# Patient Record
Sex: MALE | Race: WHITE | URBAN - METROPOLITAN AREA
[De-identification: names, ages, dates, MRNs, and addresses within clinical notes are randomized per-mention and may not be internally consistent; named-entity substitution may affect disease eponyms.]

---

## 2023-05-23 ENCOUNTER — APPOINTMENT (OUTPATIENT)
Dept: PRIMARY CARE | Facility: CLINIC | Age: 43
End: 2023-05-23
Payer: MEDICARE

## 2023-05-24 ENCOUNTER — OFFICE VISIT (OUTPATIENT)
Dept: PRIMARY CARE | Facility: CLINIC | Age: 43
End: 2023-05-24
Payer: MEDICARE

## 2023-05-24 VITALS
DIASTOLIC BLOOD PRESSURE: 70 MMHG | OXYGEN SATURATION: 94 % | SYSTOLIC BLOOD PRESSURE: 118 MMHG | HEART RATE: 88 BPM | TEMPERATURE: 96.8 F

## 2023-05-24 DIAGNOSIS — H66.90 ACUTE OTITIS MEDIA, UNSPECIFIED OTITIS MEDIA TYPE: Primary | ICD-10-CM

## 2023-05-24 PROBLEM — I10 HYPERTENSION: Status: ACTIVE | Noted: 2023-05-24

## 2023-05-24 PROBLEM — E78.5 HYPERLIPEMIA: Status: ACTIVE | Noted: 2023-05-24

## 2023-05-24 PROCEDURE — 3078F DIAST BP <80 MM HG: CPT | Performed by: FAMILY MEDICINE

## 2023-05-24 PROCEDURE — 3008F BODY MASS INDEX DOCD: CPT | Performed by: FAMILY MEDICINE

## 2023-05-24 PROCEDURE — 1036F TOBACCO NON-USER: CPT | Performed by: FAMILY MEDICINE

## 2023-05-24 PROCEDURE — 99213 OFFICE O/P EST LOW 20 MIN: CPT | Performed by: FAMILY MEDICINE

## 2023-05-24 PROCEDURE — 3074F SYST BP LT 130 MM HG: CPT | Performed by: FAMILY MEDICINE

## 2023-05-24 RX ORDER — FUROSEMIDE 20 MG/1
20 TABLET ORAL
COMMUNITY
Start: 2022-06-13 | End: 2024-06-10 | Stop reason: ALTCHOICE

## 2023-05-24 RX ORDER — ATORVASTATIN CALCIUM 20 MG/1
TABLET, FILM COATED ORAL
COMMUNITY
Start: 2022-07-19

## 2023-05-24 RX ORDER — METOPROLOL SUCCINATE 25 MG/1
TABLET, EXTENDED RELEASE ORAL
COMMUNITY
Start: 2022-06-11 | End: 2024-06-10 | Stop reason: ALTCHOICE

## 2023-05-24 RX ORDER — DOXYCYCLINE 100 MG/1
100 CAPSULE ORAL 2 TIMES DAILY
Qty: 14 CAPSULE | Refills: 0 | Status: SHIPPED | OUTPATIENT
Start: 2023-05-24 | End: 2023-05-31

## 2023-05-24 RX ORDER — ASPIRIN 81 MG/1
TABLET ORAL
COMMUNITY
Start: 2022-07-19

## 2023-05-24 RX ORDER — DESIPRAMINE HYDROCHLORIDE 25 MG/1
25 TABLET ORAL
COMMUNITY
End: 2024-02-15 | Stop reason: WASHOUT

## 2023-05-24 RX ORDER — DESIPRAMINE HYDROCHLORIDE 50 MG/1
50 TABLET ORAL NIGHTLY
COMMUNITY

## 2023-05-24 RX ORDER — RISPERIDONE 0.5 MG/1
0.5 TABLET ORAL 2 TIMES DAILY
COMMUNITY

## 2023-05-24 RX ORDER — SPIRONOLACTONE 25 MG/1
TABLET ORAL
COMMUNITY
Start: 2022-06-11 | End: 2024-02-16 | Stop reason: SDUPTHER

## 2023-05-24 ASSESSMENT — ENCOUNTER SYMPTOMS
FEVER: 0
COUGH: 1
CHILLS: 0

## 2023-05-24 NOTE — PROGRESS NOTES
Assessment/Plan   ASSESSMENT/PLAN:      Zhang was seen today for cough.  Diagnoses and all orders for this visit:  Acute otitis media, unspecified otitis media type (Primary)  -     doxycycline (Vibramycin) 100 mg capsule; Take 1 capsule (100 mg) by mouth 2 times a day for 7 days. Take with at least 8 ounces (large glass) of water, do not lie down for 30 minutes after       Patient Instructions   Otitis media: Try doxycycline, continue follow-up with ENT     Alexi Pitts,      FUTURE DIRECTION:     Subjective   SUBJECTIVE:     Patient ID: Zhang Alvarado is a 43 y.o. malefor the following:    Ear pain  Per mother, patient had been coughing 3 weeks ago seem to have improved but started to experience more coughing over the past few days.  She also noticed that patient complaining of right ear pain.  Declining COVID test at this time      Review of Systems   Constitutional:  Negative for chills and fever.   HENT:  Positive for ear pain.    Respiratory:  Positive for cough.        Allergies   Allergen Reactions    Penicillins Diarrhea and Other     Diarrhea   Can take amoxicillin    augmentin-diarreha    diarrhea    Cephalexin Other, Rash and Unknown         Current Outpatient Medications:     aspirin 81 mg EC tablet, Take by mouth., Disp: , Rfl:     atorvastatin (Lipitor) 20 mg tablet, , Disp: , Rfl:     furosemide (Lasix) 20 mg tablet, 1 tablet (20 mg)., Disp: , Rfl:     metoprolol succinate XL (Toprol-XL) 25 mg 24 hr tablet, , Disp: , Rfl:     sacubitriL-valsartan (Entresto) 24-26 mg tablet, Take 1 tablet by mouth twice a day., Disp: 60 tablet, Rfl: 11    spironolactone (Aldactone) 25 mg tablet, , Disp: , Rfl:     desipramine (Norpramin) 25 mg tablet, Take 1 tablet (25 mg) by mouth once daily in the morning. Take before meals., Disp: , Rfl:     desipramine (Norpramin) 50 mg tablet, Take 1 tablet (50 mg) by mouth once daily at bedtime., Disp: , Rfl:     doxycycline (Vibramycin) 100 mg capsule, Take 1 capsule  (100 mg) by mouth 2 times a day for 7 days. Take with at least 8 ounces (large glass) of water, do not lie down for 30 minutes after, Disp: 14 capsule, Rfl: 0    risperiDONE (RisperDAL) 0.5 mg tablet, Take 1 tablet (0.5 mg) by mouth 2 times a day., Disp: , Rfl:      Patient Active Problem List   Diagnosis    Hypertension    Hyperlipemia       Social History     Socioeconomic History    Marital status: Single     Spouse name: Not on file    Number of children: Not on file    Years of education: Not on file    Highest education level: Not on file   Occupational History    Not on file   Tobacco Use    Smoking status: Never    Smokeless tobacco: Never   Vaping Use    Vaping status: Not on file   Substance and Sexual Activity    Alcohol use: Never    Drug use: Never    Sexual activity: Not on file   Other Topics Concern    Not on file   Social History Narrative    Not on file     Social Determinants of Health     Financial Resource Strain: Not on file   Food Insecurity: Not on file   Transportation Needs: Not on file   Physical Activity: Not on file   Stress: Not on file   Social Connections: Not on file   Intimate Partner Violence: Not on file   Housing Stability: Not on file       Objective   OBJECTIVE:     Vitals:    05/24/23 1503   BP: 118/70   Pulse: 88   Temp: 36 °C (96.8 °F)   SpO2: 94%       Exam      Physical Exam  Constitutional:       Appearance: Normal appearance.   HENT:      Head: Normocephalic.      Right Ear: Drainage present. Tympanic membrane is injected.      Left Ear: Tympanic membrane normal.      Mouth/Throat:      Pharynx: No oropharyngeal exudate or posterior oropharyngeal erythema.   Pulmonary:      Effort: Pulmonary effort is normal.   Musculoskeletal:      Cervical back: Normal range of motion.   Neurological:      Mental Status: He is alert.   Psychiatric:         Mood and Affect: Mood normal.

## 2023-07-20 LAB
ANION GAP IN SER/PLAS: 12 MMOL/L (ref 10–20)
CALCIUM (MG/DL) IN SER/PLAS: 9.4 MG/DL (ref 8.6–10.3)
CARBON DIOXIDE, TOTAL (MMOL/L) IN SER/PLAS: 32 MMOL/L (ref 21–32)
CHLORIDE (MMOL/L) IN SER/PLAS: 98 MMOL/L (ref 98–107)
CREATININE (MG/DL) IN SER/PLAS: 1.13 MG/DL (ref 0.5–1.3)
GFR MALE: 83 ML/MIN/1.73M2
GLUCOSE (MG/DL) IN SER/PLAS: 81 MG/DL (ref 74–99)
MAGNESIUM (MG/DL) IN SER/PLAS: 2.18 MG/DL (ref 1.6–2.4)
NATRIURETIC PEPTIDE B (PG/ML) IN SER/PLAS: 3 PG/ML (ref 0–99)
POTASSIUM (MMOL/L) IN SER/PLAS: 4.3 MMOL/L (ref 3.5–5.3)
SODIUM (MMOL/L) IN SER/PLAS: 138 MMOL/L (ref 136–145)
UREA NITROGEN (MG/DL) IN SER/PLAS: 18 MG/DL (ref 6–23)

## 2023-10-19 ENCOUNTER — PHARMACY VISIT (OUTPATIENT)
Dept: PHARMACY | Facility: CLINIC | Age: 43
End: 2023-10-19
Payer: COMMERCIAL

## 2023-10-29 ENCOUNTER — PHARMACY VISIT (OUTPATIENT)
Dept: PHARMACY | Facility: CLINIC | Age: 43
End: 2023-10-29
Payer: COMMERCIAL

## 2023-11-10 ENCOUNTER — PHARMACY VISIT (OUTPATIENT)
Dept: PHARMACY | Facility: CLINIC | Age: 43
End: 2023-11-10
Payer: COMMERCIAL

## 2023-11-10 PROCEDURE — RXMED WILLOW AMBULATORY MEDICATION CHARGE

## 2023-11-28 ENCOUNTER — PHARMACY VISIT (OUTPATIENT)
Dept: PHARMACY | Facility: CLINIC | Age: 43
End: 2023-11-28
Payer: COMMERCIAL

## 2023-11-28 PROCEDURE — RXMED WILLOW AMBULATORY MEDICATION CHARGE

## 2023-11-28 RX ORDER — ATORVASTATIN CALCIUM 20 MG/1
20 TABLET, FILM COATED ORAL NIGHTLY
Qty: 90 TABLET | Refills: 1 | Status: CANCELLED | OUTPATIENT
Start: 2023-11-28 | End: 2024-11-27

## 2023-11-29 ENCOUNTER — PHARMACY VISIT (OUTPATIENT)
Dept: PHARMACY | Facility: CLINIC | Age: 43
End: 2023-11-29
Payer: COMMERCIAL

## 2023-11-29 DIAGNOSIS — E78.5 HYPERLIPIDEMIA, UNSPECIFIED HYPERLIPIDEMIA TYPE: Primary | ICD-10-CM

## 2023-11-29 PROCEDURE — RXMED WILLOW AMBULATORY MEDICATION CHARGE

## 2023-11-29 RX ORDER — ATORVASTATIN CALCIUM 20 MG/1
20 TABLET, FILM COATED ORAL NIGHTLY
Qty: 90 TABLET | Refills: 1 | Status: SHIPPED | OUTPATIENT
Start: 2023-11-29 | End: 2024-06-10 | Stop reason: ALTCHOICE

## 2023-11-29 RX ORDER — ATORVASTATIN CALCIUM 20 MG/1
20 TABLET, FILM COATED ORAL NIGHTLY
Qty: 90 TABLET | Refills: 1 | Status: CANCELLED | OUTPATIENT
Start: 2023-11-28 | End: 2024-11-27

## 2024-01-09 ENCOUNTER — TELEPHONE (OUTPATIENT)
Dept: CARDIOLOGY | Facility: CLINIC | Age: 44
End: 2024-01-09
Payer: COMMERCIAL

## 2024-01-09 DIAGNOSIS — I50.42 CHRONIC COMBINED SYSTOLIC AND DIASTOLIC HEART FAILURE (MULTI): ICD-10-CM

## 2024-01-09 DIAGNOSIS — I10 HYPERTENSION, UNSPECIFIED TYPE: Primary | ICD-10-CM

## 2024-01-09 NOTE — TELEPHONE ENCOUNTER
1/9/24  1525  Labs ordered for upcoming appt; per Dr. Weinberg' last office note.    Called mother of patient and informed her of such.    Mother verbalized understanding.    ----- Message from Edilma Dove sent at 1/9/2024 10:03 AM EST -----  Patient is saying he has an echocardiogram on 01/17/24, he is not scheduled for that. The system says 07/17. Also she said he's to have blood work it is not under labs.  Can you call   Zhang Flores's mom and guardian at [phone].  Thank you :)

## 2024-01-11 ENCOUNTER — LAB (OUTPATIENT)
Dept: LAB | Facility: LAB | Age: 44
End: 2024-01-11
Payer: MEDICARE

## 2024-01-11 DIAGNOSIS — I10 HYPERTENSION, UNSPECIFIED TYPE: ICD-10-CM

## 2024-01-11 DIAGNOSIS — I50.42 CHRONIC COMBINED SYSTOLIC AND DIASTOLIC HEART FAILURE (MULTI): ICD-10-CM

## 2024-01-11 LAB
ANION GAP SERPL CALC-SCNC: 12 MMOL/L (ref 10–20)
BNP SERPL-MCNC: 6 PG/ML (ref 0–99)
BUN SERPL-MCNC: 19 MG/DL (ref 6–23)
CALCIUM SERPL-MCNC: 9.5 MG/DL (ref 8.6–10.3)
CHLORIDE SERPL-SCNC: 101 MMOL/L (ref 98–107)
CO2 SERPL-SCNC: 28 MMOL/L (ref 21–32)
CREAT SERPL-MCNC: 0.9 MG/DL (ref 0.5–1.3)
EGFRCR SERPLBLD CKD-EPI 2021: >90 ML/MIN/1.73M*2
GLUCOSE SERPL-MCNC: 90 MG/DL (ref 74–99)
MAGNESIUM SERPL-MCNC: 2.28 MG/DL (ref 1.6–2.4)
POTASSIUM SERPL-SCNC: 4 MMOL/L (ref 3.5–5.3)
SODIUM SERPL-SCNC: 137 MMOL/L (ref 136–145)

## 2024-01-11 PROCEDURE — 36415 COLL VENOUS BLD VENIPUNCTURE: CPT

## 2024-01-11 PROCEDURE — 83735 ASSAY OF MAGNESIUM: CPT

## 2024-01-11 PROCEDURE — 80048 BASIC METABOLIC PNL TOTAL CA: CPT

## 2024-01-11 PROCEDURE — 83880 ASSAY OF NATRIURETIC PEPTIDE: CPT

## 2024-01-17 ENCOUNTER — HOSPITAL ENCOUNTER (OUTPATIENT)
Dept: CARDIOLOGY | Facility: HOSPITAL | Age: 44
Discharge: HOME | End: 2024-01-17
Payer: MEDICARE

## 2024-01-17 DIAGNOSIS — I50.42 CHRONIC COMBINED SYSTOLIC (CONGESTIVE) AND DIASTOLIC (CONGESTIVE) HEART FAILURE (MULTI): ICD-10-CM

## 2024-01-17 DIAGNOSIS — I25.10 ATHEROSCLEROTIC HEART DISEASE OF NATIVE CORONARY ARTERY WITHOUT ANGINA PECTORIS: ICD-10-CM

## 2024-01-17 DIAGNOSIS — I50.40 UNSPECIFIED COMBINED SYSTOLIC (CONGESTIVE) AND DIASTOLIC (CONGESTIVE) HEART FAILURE (MULTI): ICD-10-CM

## 2024-01-17 PROCEDURE — 93306 TTE W/DOPPLER COMPLETE: CPT | Performed by: INTERNAL MEDICINE

## 2024-01-17 PROCEDURE — 93306 TTE W/DOPPLER COMPLETE: CPT

## 2024-01-17 PROCEDURE — 2500000004 HC RX 250 GENERAL PHARMACY W/ HCPCS (ALT 636 FOR OP/ED): Performed by: INTERNAL MEDICINE

## 2024-01-17 RX ADMIN — HUMAN ALBUMIN MICROSPHERES AND PERFLUTREN 0.5 ML: 10; .22 INJECTION, SOLUTION INTRAVENOUS at 09:05

## 2024-01-18 ENCOUNTER — TELEPHONE (OUTPATIENT)
Dept: CARDIOLOGY | Facility: CLINIC | Age: 44
End: 2024-01-18
Payer: COMMERCIAL

## 2024-01-18 LAB
EJECTION FRACTION APICAL 4 CHAMBER: 46.8
EJECTION FRACTION: 45
LEFT ATRIUM VOLUME AREA LENGTH INDEX BSA: 26.8
LEFT VENTRICLE INTERNAL DIMENSION DIASTOLE: 6.1 (ref 3.5–6)
LEFT VENTRICULAR OUTFLOW TRACT DIAMETER: 2.3
MITRAL VALVE E/A RATIO: 1.02
MITRAL VALVE E/E' RATIO: 8.2
RIGHT VENTRICLE FREE WALL PEAK S': 12.5
TRICUSPID ANNULAR PLANE SYSTOLIC EXCURSION: 2

## 2024-01-18 NOTE — TELEPHONE ENCOUNTER
1/18/24  1529  Called results to mother of patient who is patient's guardian. Mother verbalized understanding and will go over results at office visit on 1/24/24.      ----- Message from Rajesh Kumari DO sent at 1/18/2024 10:22 AM EST -----  Mildly reduced left ventricular systolic function with an ejection fraction of 45%, grade 2 diastolic dysfunction, normal right ventricular systolic function, no significant valve abnormalities.

## 2024-01-20 PROBLEM — E66.9 OBESITY (BMI 30-39.9): Status: ACTIVE | Noted: 2024-01-20

## 2024-01-20 PROBLEM — I50.42 CHRONIC COMBINED SYSTOLIC AND DIASTOLIC HEART FAILURE (MULTI): Status: ACTIVE | Noted: 2024-01-20

## 2024-01-20 PROBLEM — I25.10 CORONARY ARTERY DISEASE INVOLVING NATIVE CORONARY ARTERY OF NATIVE HEART: Status: ACTIVE | Noted: 2024-01-20

## 2024-01-20 PROBLEM — F89 DEVELOPMENTAL DISABILITY: Status: ACTIVE | Noted: 2024-01-20

## 2024-01-20 PROBLEM — G47.33 OSA (OBSTRUCTIVE SLEEP APNEA): Status: ACTIVE | Noted: 2024-01-20

## 2024-01-20 NOTE — PROGRESS NOTES
[facility] Heart and Vascular Cardiology    Patient Name: Zhang Alvarado  Patient : 1980      Scribe Attestation  By signing my name below, I, Marly Grandaibsophia   attest that this documentation has been prepared under the direction and in the presence of Rajesh Kumari DO.      Reason for visit:  This is a 43-year-old male here for follow-up regarding chronic systolic and diastolic heart failure with an ejection fraction of 45%, minimal coronary artery disease as seen on cardiac catheterization done in 2022, dyslipidemia, developmental disability, obstructive sleep apnea, and obesity.    HPI:  This is a 43-year-old male here for follow-up regarding chronic systolic and diastolic heart failure with an ejection fraction of 45%, minimal coronary artery disease as seen on cardiac catheterization done in 2022, dyslipidemia, developmental disability, obstructive sleep apnea, and obesity.  The patient was last evaluated by me in 2023.  At that visit I had ordered blood work including BMP/BNP/magnesium, ordered an echocardiogram to be completed in 6 months, and asked the patient to follow-up in 6 months and sooner if necessary.  BMP done in 2024 showed normal serum sodium and potassium with a serum creatinine of 0.90, serum magnesium was 2.28, BNP was 6. Echocardiogram done in 2024 showed mildly reduced left ventricular systolic function with an ejection fraction of 45%, grade 2 diastolic dysfunction, normal right ventricular systolic function, no significant valve abnormalities.  ECG done today showed sinus rhythm with a heart rate of 91 bpm. The patient reports that he has been feeling generally well from the cardiac standpoint. He denies any new chest pain, shortness of breath, palpitations and lightheadedness. He states that he is trying to eat healthier food choices and staying physically active by playing sports. He states that he takes all of his medications as  prescribed. During my exam, he was resting comfortably on the exam table.        Assessment/Plan:   1. Chronic systolic and diastolic heart failure  The patient has a history of systolic and diastolic heart failure with an ejection fraction of 45%.  Echocardiogram done in January 2024 showed mildly reduced left ventricular systolic function with an ejection fraction of 45%, grade 2 diastolic dysfunction, normal right ventricular systolic function, no significant valve abnormalities.    He does not appear significantly volume overloaded on exam today.   He should continue current heart failure medications.  Recent lab works as noted in the HPI.   Lab works as noted below will be done in 6 months prior to his next visit.  I discussed with him the importance of following a low-sodium heart healthy diet as well as weight loss.   Follow up in 6 months and sooner if necessary.      2. Coronary artery disease  The patient has a history of minimal coronary artery disease as seen on cardiac catheterization done in June 2022.  ECG done today showed sinus rhythm with a heart rate of 91 bpm.    He denies anginal chest discomfort.  Blood pressure appears controlled on exam today.  He should continue current antihypertensive medications and antiplatelet therapy.  Echocardiogram done in January 2024 showed mildly reduced left ventricular systolic function with an ejection fraction of 45%, grade 2 diastolic dysfunction, normal right ventricular systolic function, no significant valve abnormalities.    Lipid panel done in February 2023 showed an LDL cholesterol 43 and triglycerides of 245 while on atorvastatin 20 mg daily.   Recent lab works as noted in the HPI.  Lab works will be done today and again in 6 months prior to the next visit.   Please see lifestyle recommendations below.   Follow up in 6 months and sooner if necessary.      3. Dyslipidemia  Lipid panel done in February 2023 showed an LDL cholesterol 43 and triglycerides  of 245 while on atorvastatin 20 mg daily.   Lipid panel will be done in 6 months.  Please see lifestyle recommendations below.      4. Developmental disability  Management as per PCP.      5. Obstructive sleep apnea  The patient has a history of obstructive sleep apnea.  He should continue to follow up with Sleep Medicine.     6. Obesity  Please see lifestyle recommendations below.         Orders:   CMP/lipid/magnesium/CBC in 6 months,   Follow-up in 6 months.      Lifestyle Recommendations  I recommend a whole-food plant-based diet, an eating pattern that encourages the consumption of unrefined plant foods (such as fruits, vegetables, tubers, whole grains, legumes, nuts and seeds) and discourages meats, dairy products, eggs and processed foods.     The AHA/ACC recommends that the patient consume a dietary pattern that emphasizes intake of vegetables, fruits, and whole grains; includes low-fat dairy products, poultry, fish, legumes, non-tropical vegetable oils, and nuts; and limits intake of sodium, sweets, sugar-sweetened beverages, and red meats.  Adapt this dietary pattern to appropriate calorie requirements (a 500-750 kcal/day deficit to loose weight), personal and cultural food preferences, and nutrition therapy for other medical conditions (including diabetes).  Achieve this pattern by following plans such as the [place] Mediterranean, DASH dietary pattern, or AHA diet.     Engage in 2 hours and 30 minutes per week of moderate-intensity physical activity, or 1 hour and 15 minutes (75 minutes) per week of vigorous-intensity aerobic physical activity, or an equivalent combination of moderate and vigorous-intensity aerobic physical activity. Aerobic activity should be performed in episodes of at least 10 minutes preferably spread throughout the week.     Adhering to a heart healthy diet, regular exercise habits, avoidance of tobacco products, and maintenance of a healthy weight are crucial components of their heart  disease risk reduction.     Any positive review of systems not specifically addressed in the office visit today should be evaluated and treated by the patients primary care physician or in an emergency department if necessary     Patient was notified that results from ordered tests will be called to the patient if it changes current management; it will otherwise be discussed at a future appointment and available on [facility].     Thank you for allowing me to participate in the care of this patient.        This document was generated using the assistance of voice recognition software. If there are any errors of spelling, grammar, syntax, or meaning; please feel free to contact me directly for clarification.    Past Medical History:  He has a past medical history of Disturbances of salivary secretion, Other conditions influencing health status, Personal history of diseases of the skin and subcutaneous tissue, Personal history of other diseases of the circulatory system, and Raynaud's syndrome without gangrene.    Past Surgical History:  He has a past surgical history that includes Other surgical history (04/05/2019).      Social History:  He reports that he has never smoked. He has never used smokeless tobacco. He reports that he does not drink alcohol and does not use drugs.    Family History:  No family history on file.     Allergies:  Penicillins and Cephalexin    Outpatient Medications:  Current Outpatient Medications   Medication Instructions    aspirin 81 mg EC tablet oral    atorvastatin (Lipitor) 20 mg tablet     atorvastatin (Lipitor) 20 mg tablet TAKE 1 TABLET BY MOUTH AT BEDTIME    desipramine (NORPRAMIN) 25 mg, oral, Daily before breakfast    desipramine (NORPRAMIN) 50 mg, oral, Nightly    furosemide (Lasix) 20 mg tablet TAKE 1 TABLET BY MOUTH ONCE DAILY    furosemide (LASIX) 20 mg    metoprolol succinate XL (Toprol-XL) 25 mg 24 hr tablet     metoprolol succinate XL (Toprol-XL) 25 mg 24 hr tablet TAKE 1  "TABLET BY MOUTH TWO TIMES A DAY    risperiDONE (RISPERDAL) 0.5 mg, oral, 2 times daily    sacubitriL-valsartan (Entresto) 24-26 mg tablet 1 tablet, oral, 2 times daily    sacubitriL-valsartan (Entresto) 24-26 mg tablet TAKE 1 TABLET BY MOUTH TWICE A DAY    sacubitriL-valsartan (Entresto) 24-26 mg tablet TAKE 1 TABLET BY MOUTH TWICE A DAY    spironolactone (Aldactone) 25 mg tablet         ROS:  A 14 point review of systems was done and is negative other than as stated in HPI    Vitals:      7/19/2022     9:13 AM 9/8/2022     3:24 PM 10/10/2022     8:42 AM 12/14/2022     3:07 PM 2/21/2023     3:42 PM 5/24/2023     3:03 PM 7/20/2023     2:19 PM   Vitals   Systolic 120 118 124 120 125 118 120   Diastolic 82 74 70 76 80 70 78   Heart Rate 83 78 83 82 83 88 84   Temp     36.3 °C (97.3 °F) 36 °C (96.8 °F)    Resp   16  16     Height (in) 1.626 m (5' 4\") 1.626 m (5' 4\") 1.626 m (5' 4\") 1.626 m (5' 4\") 1.626 m (5' 4\")  1.626 m (5' 4\")   Weight (lb) 209 204 204 204 207.7  203.38   BMI 35.87 kg/m2 35.02 kg/m2 35.02 kg/m2 35.02 kg/m2 35.65 kg/m2  34.91 kg/m2   BSA (m2) 2.07 m2 2.04 m2 2.04 m2 2.04 m2 2.06 m2  2.04 m2        Physical Exam:   Constitutional: Cooperative, in no acute distress, alert, appears stated age.   Skin: Skin color, texture, turgor normal. No rashes or lesions.   Head: Normocephalic. No masses, lesions, tenderness or abnormalities   Eyes: Extraocular movements are grossly intact.   Mouth and throat: Mucous membranes moist   Neck: Neck supple, no carotid bruits, no JVD   Respiratory: Lungs clear to auscultation, no wheezing or rhonchi, no use of accessory muscles   Chest wall: No scars, normal excursion with respiration   Cardiovascular: Regular rhythm without murmur, gallop, or rubs   Gastrointestinal: Abdomen soft, nontender. Bowel sounds normal. Obese.  Musculoskeletal: Strength equal in upper extremities   Extremities: No pitting edema   Neurologic: Sensation grossly intact, alert and oriented " x3      Intake/Output:   No intake/output data recorded.    Outpatient Medications  Current Outpatient Medications on File Prior to Visit   Medication Sig Dispense Refill    aspirin 81 mg EC tablet Take by mouth.      atorvastatin (Lipitor) 20 mg tablet       atorvastatin (Lipitor) 20 mg tablet TAKE 1 TABLET BY MOUTH AT BEDTIME 90 tablet 1    desipramine (Norpramin) 25 mg tablet Take 1 tablet (25 mg) by mouth once daily in the morning. Take before meals.      desipramine (Norpramin) 50 mg tablet Take 1 tablet (50 mg) by mouth once daily at bedtime.      furosemide (Lasix) 20 mg tablet 1 tablet (20 mg).      furosemide (Lasix) 20 mg tablet TAKE 1 TABLET BY MOUTH ONCE DAILY 90 tablet 2    metoprolol succinate XL (Toprol-XL) 25 mg 24 hr tablet       metoprolol succinate XL (Toprol-XL) 25 mg 24 hr tablet TAKE 1 TABLET BY MOUTH TWO TIMES A  tablet 1    risperiDONE (RisperDAL) 0.5 mg tablet Take 1 tablet (0.5 mg) by mouth 2 times a day.      sacubitriL-valsartan (Entresto) 24-26 mg tablet Take 1 tablet by mouth twice a day. 60 tablet 11    sacubitriL-valsartan (Entresto) 24-26 mg tablet TAKE 1 TABLET BY MOUTH TWICE A  tablet 2    sacubitriL-valsartan (Entresto) 24-26 mg tablet TAKE 1 TABLET BY MOUTH TWICE A  tablet 1    spironolactone (Aldactone) 25 mg tablet        No current facility-administered medications on file prior to visit.       Labs: (past 26 weeks)  Recent Results (from the past 4368 hour(s))   Basic metabolic panel    Collection Time: 01/11/24  2:01 PM   Result Value Ref Range    Glucose 90 74 - 99 mg/dL    Sodium 137 136 - 145 mmol/L    Potassium 4.0 3.5 - 5.3 mmol/L    Chloride 101 98 - 107 mmol/L    Bicarbonate 28 21 - 32 mmol/L    Anion Gap 12 10 - 20 mmol/L    Urea Nitrogen 19 6 - 23 mg/dL    Creatinine 0.90 0.50 - 1.30 mg/dL    eGFR >90 >60 mL/min/1.73m*2    Calcium 9.5 8.6 - 10.3 mg/dL   B-Type Natriuretic Peptide    Collection Time: 01/11/24  2:01 PM   Result Value Ref Range     BNP 6 0 - 99 pg/mL   Magnesium    Collection Time: 01/11/24  2:01 PM   Result Value Ref Range    Magnesium 2.28 1.60 - 2.40 mg/dL   Transthoracic Echo (TTE) Complete    Collection Time: 01/17/24  9:13 AM   Result Value Ref Range    LVOT diam 2.30     LV biplane EF 45     MV E/A ratio 1.02     MV avg E/e' ratio 8.20     Tricuspid annular plane systolic excursion 2.0     LA vol index A/L 26.8     RV free wall pk S' 12.50     LVIDd 6.10     LV A4C EF 46.8        ECG  No results found for this or any previous visit (from the past 4464 hour(s)).    Echocardiogram  No results found for this or any previous visit from the past 1095 days.      CV Studies:  EKG: No results found for this or any previous visit (from the past 4464 hour(s)).  Echocardiogram:   Echocardiogram     [place] [id number]  Phone [phone] Fax [phone]    TRANSTHORACIC ECHOCARDIOGRAM REPORT      Patient Name:     JAVIER Turcios Physician:   [id number] Anthony Herman MD  Study Date:       10/7/2022    Referring Physician: [id number] CROSS  MRN/PID:          [id number]     PCP:  Accession/Order#: [id number] Department Location: [facility] Echo Lab  YOB: 1980    Fellow:  Gender:           M            Nurse:  Admit Date:                    Sonographer:         Brigid Fischer VEE  Admission Status: Outpatient   Additional Staff:  Height:           162.56 cm    CC Report to:  Weight:           92.53 kg     Study Type:          Echocardiogram  BSA:              1.97 m2  Blood Pressure: 118 /74 mmHg    Diagnosis/ICD: I50.40-Unspecified combined systolic (congestive) and diastolic  (congestive) heart failure (CHF); I25.10-Atherosclerotic heart  disease of native coronary artery without angina pectoris  Indication:    CAD, CHF  Procedure/CPT: Echo Complete w Full Doppler-[id number]    Patient History:  Pertinent History: CAD, CHF and Dyslipidemia.    Study Detail: The following  Echo studies were performed: 2D, M-Mode, Doppler and  color flow. Technically challenging study due to body habitus.      PHYSICIAN INTERPRETATION:  Left Ventricle: Left ventricular systolic function is moderately decreased, with an estimated ejection fraction of 35-40%. There is global hypokinesis of the left ventricle with minor regional variations. The left ventricular cavity size is normal. There is moderate concentric left ventricular hypertrophy. Spectral Doppler shows a restrictive pattern of left ventricular diastolic filling.  Left Atrium: The left atrium is normal in size.  Right Ventricle: The right ventricle is upper limits of normal in size. There is mildly reduced right ventricular systolic function.  Right Atrium: The right atrium is normal in size.  Aortic Valve: The aortic valve appears structurally normal. There is no evidence of aortic valve regurgitation.  Mitral Valve: The mitral valve is normal in structure. There is no evidence of mitral valve regurgitation.  Tricuspid Valve: The tricuspid valve is structurally normal. There is mild tricuspid regurgitation.  Pulmonic Valve: The pulmonic valve is structurally normal. There is no indication of pulmonic valve regurgitation.  Pericardium: There is no pericardial effusion noted.  Aorta: The aortic root is normal.      CONCLUSIONS:  1. Left ventricular systolic function is moderately decreased with a 35-40% estimated ejection fraction.  2. Spectral Doppler shows a restrictive pattern of left ventricular diastolic filling.  3. There is moderate concentric left ventricular hypertrophy.  4. There is mildly reduced right ventricular systolic function.  5. There is global hypokinesis of the left ventricle with minor regional variations.    QUANTITATIVE DATA SUMMARY:  2D MEASUREMENTS:  Normal Ranges:  Ao Root d:     3.30 cm    (2.0-3.7cm)  IVSd:          1.40 cm    (0.6-1.1cm)  LVPWd:         1.40 cm    (0.6-1.1cm)  LVIDd:         6.30 cm     (3.9-5.9cm)  LVIDs:         5.70 cm  LV Mass Index: 212.7 g/m2  LV % FS        9.5 %    LA VOLUME:  Normal Ranges:  LA Vol A4C:        41.6 ml    (22+/-6mL/m2)  LA Vol A2C:        41.6 ml  LA Vol BP:         41.6 ml  LA Vol Index A4C:  21.1ml/m2  LA Vol Index A2C:  21.1 ml/m2  LA Vol Index BP:   21.1 ml/m2  LA Area A4C:       14.0 cm2  LA Area A2C:       14.0 cm2  LA Major Axis A4C: 4.0 cm  LA Major Axis A2C: 4.0 cm  LA Volume Index:   21.0 ml/m2    RA VOLUME BY A/L METHOD:  Normal Ranges:  RA Area A4C: 10.0 cm2    M-MODE MEASUREMENTS:  Normal Ranges:  Ao Root: 3.20 cm (2.0-3.7cm)  AoV Exc: 2.40 cm (1.5-2.5cm)  LAs:     3.30 cm (2.7-4.0cm)    AORTA MEASUREMENTS:  Normal Ranges:  AoV Exc:   2.40 cm (1.5-2.5cm)  Asc Ao, d: 2.50 cm (2.1-3.4cm)    LV SYSTOLIC FUNCTION BY 2D PLANIMETRY (MOD):  Normal Ranges:  EF-A4C View: 40.9 % (>55%)  EF-A2C View: 33.8 %  EF-Biplane:  37.9 %    LV DIASTOLIC FUNCTION:  Normal Ranges:  MV Peak E:    0.76 m/s   (0.7-1.2 m/s)  MV Peak A:    0.40 m/s   (0.42-0.7 m/s)  E/A Ratio:    1.86       (1.0-2.2)  MV e'         0.08 m/s   (>8.0)  MV lateral e' 0.10 m/s  MV medial e'  0.07 m/s  MV A Dur:     81.00 msec  E/e' Ratio:   8.88       (<8.0)  LV IVRT:      109 msec   (<100ms)    MITRAL VALVE:  Normal Ranges:  MV DT: 165 msec (150-240msec)    AORTIC VALVE:  Normal Ranges:  LVOT Max Andrea:  0.95 m/s (<1.1m/s)  LVOT VTI:      16.20 cm  LVOT Diameter: 2.30 cm  (1.8-2.4cm)    RIGHT VENTRICLE:  RV 1   3.7 cm  RV 2   3.1 cm  RV 3   7.7 cm  TAPSE: 20.0 mm  RV s'  0.14 m/s    TRICUSPID VALVE/RVSP:  Normal Ranges:  Peak TR Velocity: 2.20 m/s  Est. RA Pressure: 3 mmHg  RV Syst Pressure: 22.4 mmHg (< 30mmHg)  TV E Vmax:        0.55 m/s  (0.3-0.7m/s)  IVC Diam:         0.90 cm    PULMONIC VALVE:  Normal Ranges:  PIEDV: 1.17 m/s  PADP:  8.5 mmHg      [id number] Anthony Herman MD  Electronically signed on 10/7/2022 at 6:49:31 PM         Final     Stress Testing IMGRESULT(JKN0007:1:1825): No results found for this or  any previous visit from the past 1825 days.    Cardiac Catheterization:   Adult Cath     [facility], Cath Lab  [address] [id number]  Phone [phone] Fax [phone]    Cardiovascular Catheterization Report    Patient Name:     JAVIER RIOS Performing Physician: [id number] Donavon Manning MD  Study Date:       6/9/2022    Verifying Physician:  [id number] Manning MD  MRN/PID:          [id number]    Cardiologist:  Accession/Order#: [id number]   Referring Physician:  [id number] BENIGNO CROSS  YOB: 1980   Referring Physician:  Gender:           M           Referring Physician:      Study: Left and Right Heart Catheterization      Indications:  JAVIER RIOS is a 42 year old male who presents with chronic pulmonary disease. Cardiomyopathy and left ventricular dysfunction, with an asymptomatic chest pain assessment. Study performed as an urgent cath procedure. Heart failure.    Medical History:  Stress test performed: No. CTA performed: No. Agatston accessed: No. LVEF Assessed: Yes. LVEF = 25-30%%.    Procedure Description:  After infiltration with 2% Lidocaine, the right femoral artery was cannulated with a modified Seldinger technique. Subsequently a 6 Japanese sheath was placed in the right femoral artery. After infiltration of local anesthetic, the right femoral vein was cannulated with a micropuncture technique. A 7 Japanese sheath was placed in the vein. Selective coronary catheterization was performed using a 6 Fr catheter(s) exchanged over a guide wire to cannulate the coronary arteries. A JL 4 tip catheter was used for left coronary injections. A JR 4 tip catheter was used for right coronary injections.  Multiple injections of contrast were made into the left and right coronary arteries with angiograms recorded in multiple projections. A balloon tipped catheter was advanced through the right heart to record pressures. Cardiac output was calculated via the Kailey method.  After completion of the procedure, femoral artery angiography was performed. This demonstrated a common femoral artery puncture appropriate for closure. An Angio-Seal Evolution 6F ([employer]) vascular closure device was placed per protocol. Post-procedure, the venous sheath was pulled and pressure was applied to the site.    Coronary Angiography:  The coronary circulation is right dominant.    Left Main Coronary Artery:  The left main coronary artery is a large caliber vessel. The left main arises normally from the left coronary sinus of Valsalva, bifurcates into the LAD and circumflex coronary arteries and gives rise to the ramus intermedius artery. The left main coronary artery showed mild luminal irregularities.    Left Anterior Descending Coronary Artery Distribution:  The left anterior descending coronary artery is a large caliber vessel. The LAD arises normally from the left main coronary artery and wraps around the apex of the LV. The LAD demonstrated mild distal atherosclerotic disease. The 1st diagonal branch is a normal caliber vessel. The 1st diagonal branch showed no significant disease or stenosis greater than 30%. The 2nd diagonal branch is a small caliber vessel. The 2nd diagonal branch demonstrated no significant disease or stenosis greater than 30%. The 3rd diagonal branch is a normal caliber vessel. The 3rd diagonal branch revealed no significant disease or stenosis greater than 30%.    Circumflex Coronary Artery Distribution:  The circumflex coronary artery is a large caliber vessel. The circumflex arises normally from the left main coronary artery, giving rise to the first obtuse marginal, second obtuse marginal and third obtuse marginal. The circumflex revealed mild luminal irregularities. The 1st obtuse marginal branch is a normal caliber vessel. The ostial 1st obtuse marginal branch showed 30% stenosis. This lesion was focal. The 2nd obtuse marginal branch is a medium-sized caliber  vessel. The 2nd obtuse marginal branch demonstrated mild atherosclerotic disease.    Ramus Intermedius:  The ramus intermedius is a large caliber vessel. The ramus intermedius arises normally from the left main coronary artery. The ramus intermedius showed no significant disease or stenosis greater than 30%.    Right Heart Catheterization:  A balloon tipped catheter was advanced through the right heart to record pressures. Cardiac output was calculated via the Kailey method. Elevated left sided filling pressures with low cardiac output. Cardiac output is moderately decreased. No evidence of shunt. Cardiogenic shock. Pulmonary venous hypertension. Elevated right and left sided filling pressures, depressed Kailey CO/CI.  RA mean 14, PA 55/32/42, PCWP 35, LVEDP 37.    Right Coronary Artery Distribution:    The right coronary artery is a large caliber vessel. The RCA arises normally from the right sinus of Valsalva, supplies the right posterolateral descending artery and supplies the posterolateral system. The RCA showed diffuse mild atherosclerotic disease.    Coronary Lesion Summary:  Vessel   Stenosis   Vessel Segment  OM 1   30% stenosis     ostial          Complications:  No in-lab complications observed.    Cardiac Cath Transition of Care Summary:  Post Procedure           Mild CAD.  Diagnosis:  Blood Loss:              Estimated blood loss during the procedure was minimal  mls.  Specimens Removed:       Number of specimen(s) removed: for sats.      Recommendations:  Maximize medical therapy.  Agressive risk factor modification efforts.  Telemetry monitoring.  Follow-up with cardiology clinic.  Monitor vitals and arterial access site/pulses.  Lipid lowering agent or Statin therapy.  Discussed RHC findings with referring physician Dr. Kumari, plan for diuresis,  optimal medical therapy for heart failure, and short course of inotropic  therapy.  Medical management of coronary artery disease.  Aspirin  therapy.    ____________________________________________________________________________________  CONCLUSIONS:  1. Mild nonobstructive CAD.  2. Elevated right and left sided filling pressures, depressed Kailey CO/CI.  RA mean 14, PA 55/32/42, PCWP 35, LVEDP 37.    ____________________________________________________________________________________  CPT Codes:  Right & Left Heart Cath w/ventriculography/Coronary angio (RHC)([facility])-66469; Moderate Sedation Services initial 15 minutes patient >5 years-90405    ICD 10 Codes:  I42.0-Dilated cardiomyopathy; I50.21-Acute systolic (congestive) heart failure    [id number] Donavon Manning MD  Performing Physician  Electronically signed by [id number] Donavon Manning MD on 6/15/2022 at 11:23:00 AM      cc Report to: [id number] BENIGNO CROSS           Final   No results found for this or any previous visit from the past 3650 days.     Cardiac Scoring: No results found for this or any previous visit from the past 1825 days.    AAA : No results found for this or any previous visit from the past 1825 days.    OTHER: No results found for this or any previous visit from the past 1825 days.    LAST IMAGING RESULTS  Transthoracic Echo (TTE) [place] [id number]       Phone [phone] Fax [phone]    TRANSTHORACIC ECHOCARDIOGRAM REPORT       Patient Name:      JAVIER GABRIEL Turcios Physician:    [id number] Kavya Gramajo MD  Study Date:        1/17/2024            Ordering Provider:    [id number] BENIGNO CROSS  MRN/PID:           [id number]             Fellow:  Accession#:        [id number]         Nurse:                Naz Arredondo RN  Date of Birth/Age: 1980 / 43 years Sonographer:          Cherie Burns RDCS  Gender:            M                    Additional Staff:  Height:            162.56 cm             Admit Date:  Weight:            92.08 kg             Admission Status:     Outpatient  BSA:               1.97 m2              Department Location:  [facility] Echo                                                                Lab  Blood Pressure: 120 /78 mmHg    Study Type:    TRANSTHORACIC ECHO (TTE) COMPLETE  Diagnosis/ICD: Chronic combined systolic (congestive) and diastolic (congestive)                 heart failure (CHF)-I50.42  Indication:    Congestive Heart Failure  CPT Codes:     Echo Complete w Full Doppler-[id number]    Patient History:  Pertinent History: CHF.    Study Detail: The following Echo studies were performed: 2D, M-Mode, Doppler and                color flow. Technically challenging study due to body habitus and                prominent lung artifact. Optison used as a contrast agent for                endocardial border definition.       PHYSICIAN INTERPRETATION:  Left Ventricle: Left ventricular systolic function is mildly decreased, with an estimated ejection fraction of 45%. There is global hypokinesis of the left ventricle with minor regional variations. The left ventricular cavity size is normal. The left ventricular septal wall thickness is mildly increased. Spectral Doppler shows a pseudonormal pattern of left ventricular diastolic filling.  Left Atrium: The left atrium is normal in size.  Right Ventricle: The right ventricle is normal in size. There is normal right ventricular global systolic function.  Right Atrium: The right atrium is normal in size.  Aortic Valve: The aortic valve was not well visualized. There is no evidence of aortic valve regurgitation.  Mitral Valve: The mitral valve is normal in structure. There is no evidence of mitral valve regurgitation.  Tricuspid Valve: The tricuspid valve is structurally normal. There is trace tricuspid regurgitation.  Pulmonic Valve: The pulmonic valve is structurally normal. There is trace pulmonic valve  regurgitation.  Pericardium: There is no pericardial effusion noted.  Aorta: The aortic root is normal.  Systemic Veins: The inferior vena cava appears to be of normal size.       CONCLUSIONS:   1. Left ventricular systolic function is mildly decreased with a 45% estimated ejection fraction.   2. Spectral Doppler shows a pseudonormal pattern of left ventricular diastolic filling.   3. There is global hypokinesis of the left ventricle with minor regional variations.    QUANTITATIVE DATA SUMMARY:  2D MEASUREMENTS:                            Normal Ranges:  Ao Root d:     3.20 cm    (2.0-3.7cm)  LAs:           4.00 cm    (2.7-4.0cm)  IVSd:          1.20 cm    (0.6-1.1cm)  LVPWd:         1.10 cm    (0.6-1.1cm)  LVIDd:         6.10 cm    (3.9-5.9cm)  LVIDs:         4.70 cm  LV Mass Index: 154.9 g/m2  LV % FS        23.0 %    LA VOLUME:                                Normal Ranges:  LA Vol A4C:        63.3 ml    (22+/-6mL/m2)  LA Vol A2C:        37.1 ml  LA Vol BP:         52.7 ml  LA Vol Index A4C:  32.2ml/m2  LA Vol Index A2C:  18.9 ml/m2  LA Vol Index BP:   26.8 ml/m2  LA Area A4C:       20.3 cm2  LA Area A2C:       14.3 cm2  LA Major Axis A4C: 5.5 cm  LA Major Axis A2C: 4.7 cm    RA VOLUME BY A/L METHOD:                        Normal Ranges:  RA Area A4C: 11.3 cm2    AORTA MEASUREMENTS:                     Normal Ranges:  Ao STJ, d: 2.31 cm (1.7-3.4cm)  Asc Ao, d: 3.10 cm (2.1-3.4cm)    LV SYSTOLIC FUNCTION BY 2D PLANIMETRY (MOD):                      Normal Ranges:  EF-A4C View: 46.8 % (>=55%)  EF-A2C View: 42.7 %  EF-Biplane:  45.2 %    LV DIASTOLIC FUNCTION:                         Normal Ranges:  MV Peak E:    0.53 m/s (0.7-1.2 m/s)  MV Peak A:    0.52 m/s (0.42-0.7 m/s)  E/A Ratio:    1.02     (1.0-2.2)  MV e'         0.06 m/s (>8.0)  MV lateral e' 0.07 m/s  MV medial e'  0.06 m/s  E/e' Ratio:   8.20     (<8.0)    MITRAL VALVE:                  Normal Ranges:  MV DT: 169 msec (150-240msec)    AORTIC VALVE:                           Normal Ranges:  LVOT Max Andrea:  0.95 m/s (<=1.1m/s)  LVOT VTI:      16.90 cm  LVOT Diameter: 2.30 cm  (1.8-2.4cm)       RIGHT VENTRICLE:  RV Basal 3.61 cm  RV Mid   3.85 cm  RV Major 8.2 cm  TAPSE:   19.9 mm  RV s'    0.12 m/s    TRICUSPID VALVE/RVSP:                    Normal Ranges:  IVC Diam: 1.38 cm       [id number] Kavya Gramajo MD  Electronically signed on 1/18/2024 at 9:52:51 AM       ** Final **    Problem List Items Addressed This Visit       Hyperlipemia    Chronic combined systolic and diastolic heart failure (CMS/HCC) - Primary    Coronary artery disease involving native coronary artery of native heart    Developmental disability    LEIDA (obstructive sleep apnea)    Obesity (BMI 30-39.9)          Rajesh Kumari DO, FACC, FACOI

## 2024-01-24 ENCOUNTER — OFFICE VISIT (OUTPATIENT)
Dept: CARDIOLOGY | Facility: CLINIC | Age: 44
End: 2024-01-24
Payer: MEDICARE

## 2024-01-24 VITALS
DIASTOLIC BLOOD PRESSURE: 90 MMHG | WEIGHT: 209.6 LBS | HEIGHT: 66 IN | SYSTOLIC BLOOD PRESSURE: 126 MMHG | BODY MASS INDEX: 33.68 KG/M2 | HEART RATE: 91 BPM

## 2024-01-24 DIAGNOSIS — G47.33 OSA (OBSTRUCTIVE SLEEP APNEA): ICD-10-CM

## 2024-01-24 DIAGNOSIS — I50.42 CHRONIC COMBINED SYSTOLIC AND DIASTOLIC HEART FAILURE (MULTI): Primary | ICD-10-CM

## 2024-01-24 DIAGNOSIS — I25.10 CORONARY ARTERY DISEASE INVOLVING NATIVE CORONARY ARTERY OF NATIVE HEART, UNSPECIFIED WHETHER ANGINA PRESENT: ICD-10-CM

## 2024-01-24 DIAGNOSIS — F89 DEVELOPMENTAL DISABILITY: ICD-10-CM

## 2024-01-24 DIAGNOSIS — E78.2 MIXED HYPERLIPIDEMIA: ICD-10-CM

## 2024-01-24 DIAGNOSIS — E66.9 OBESITY (BMI 30-39.9): ICD-10-CM

## 2024-01-24 PROCEDURE — 93000 ELECTROCARDIOGRAM COMPLETE: CPT | Performed by: INTERNAL MEDICINE

## 2024-01-24 PROCEDURE — 3080F DIAST BP >= 90 MM HG: CPT | Performed by: INTERNAL MEDICINE

## 2024-01-24 PROCEDURE — 3074F SYST BP LT 130 MM HG: CPT | Performed by: INTERNAL MEDICINE

## 2024-01-24 PROCEDURE — 99214 OFFICE O/P EST MOD 30 MIN: CPT | Performed by: INTERNAL MEDICINE

## 2024-01-24 PROCEDURE — 3008F BODY MASS INDEX DOCD: CPT | Performed by: INTERNAL MEDICINE

## 2024-01-24 PROCEDURE — 1036F TOBACCO NON-USER: CPT | Performed by: INTERNAL MEDICINE

## 2024-01-24 RX ORDER — VIT C/E/ZN/COPPR/LUTEIN/ZEAXAN 250MG-90MG
1 CAPSULE ORAL DAILY
COMMUNITY
Start: 2022-06-13

## 2024-01-24 RX ORDER — MULTIVITAMIN
TABLET ORAL
COMMUNITY

## 2024-02-01 PROCEDURE — RXMED WILLOW AMBULATORY MEDICATION CHARGE

## 2024-02-02 ENCOUNTER — PHARMACY VISIT (OUTPATIENT)
Dept: PHARMACY | Facility: CLINIC | Age: 44
End: 2024-02-02
Payer: COMMERCIAL

## 2024-02-15 ENCOUNTER — OFFICE VISIT (OUTPATIENT)
Dept: SLEEP MEDICINE | Facility: CLINIC | Age: 44
End: 2024-02-15
Payer: MEDICARE

## 2024-02-15 VITALS
DIASTOLIC BLOOD PRESSURE: 71 MMHG | BODY MASS INDEX: 33.11 KG/M2 | TEMPERATURE: 98.4 F | WEIGHT: 206 LBS | RESPIRATION RATE: 16 BRPM | HEART RATE: 88 BPM | SYSTOLIC BLOOD PRESSURE: 115 MMHG | HEIGHT: 66 IN | OXYGEN SATURATION: 96 %

## 2024-02-15 DIAGNOSIS — E66.9 OBESITY (BMI 30-39.9): ICD-10-CM

## 2024-02-15 DIAGNOSIS — G47.33 OSA ON CPAP: Primary | ICD-10-CM

## 2024-02-15 DIAGNOSIS — I50.42 CHRONIC COMBINED SYSTOLIC AND DIASTOLIC HEART FAILURE (MULTI): ICD-10-CM

## 2024-02-15 DIAGNOSIS — I10 PRIMARY HYPERTENSION: ICD-10-CM

## 2024-02-15 PROCEDURE — 3008F BODY MASS INDEX DOCD: CPT | Performed by: INTERNAL MEDICINE

## 2024-02-15 PROCEDURE — 1036F TOBACCO NON-USER: CPT | Performed by: INTERNAL MEDICINE

## 2024-02-15 PROCEDURE — 3078F DIAST BP <80 MM HG: CPT | Performed by: INTERNAL MEDICINE

## 2024-02-15 PROCEDURE — 99214 OFFICE O/P EST MOD 30 MIN: CPT | Performed by: INTERNAL MEDICINE

## 2024-02-15 PROCEDURE — 3074F SYST BP LT 130 MM HG: CPT | Performed by: INTERNAL MEDICINE

## 2024-02-15 ASSESSMENT — SLEEP AND FATIGUE QUESTIONNAIRES
HOW LIKELY ARE YOU TO NOD OFF OR FALL ASLEEP WHILE SITTING AND READING: HIGH CHANCE OF DOZING
HOW LIKELY ARE YOU TO NOD OFF OR FALL ASLEEP WHILE WATCHING TV: HIGH CHANCE OF DOZING
HOW LIKELY ARE YOU TO NOD OFF OR FALL ASLEEP WHILE SITTING QUIETLY AFTER LUNCH WITHOUT ALCOHOL: HIGH CHANCE OF DOZING
ESS-CHAD TOTAL SCORE: 20
HOW LIKELY ARE YOU TO NOD OFF OR FALL ASLEEP IN A CAR, WHILE STOPPED FOR A FEW MINUTES IN TRAFFIC: SLIGHT CHANCE OF DOZING
HOW LIKELY ARE YOU TO NOD OFF OR FALL ASLEEP WHILE SITTING AND TALKING TO SOMEONE: SLIGHT CHANCE OF DOZING
HOW LIKELY ARE YOU TO NOD OFF OR FALL ASLEEP WHILE LYING DOWN TO REST IN THE AFTERNOON WHEN CIRCUMSTANCES PERMIT: HIGH CHANCE OF DOZING
SITING INACTIVE IN A PUBLIC PLACE LIKE A CLASS ROOM OR A MOVIE THEATER: HIGH CHANCE OF DOZING
HOW LIKELY ARE YOU TO NOD OFF OR FALL ASLEEP WHEN YOU ARE A PASSENGER IN A CAR FOR AN HOUR WITHOUT A BREAK: HIGH CHANCE OF DOZING

## 2024-02-15 NOTE — PATIENT INSTRUCTIONS
"Thank you for coming to the Sleep Medicine Clinic today! Your sleep medicine provider today was: Ashley Adams MD Below is a summary of your treatment plan, patient education, other important information, and our contact numbers.      TREATMENT PLAN     - Continue current machine settings. Continue using machine every night all night. Order placed to renew PAP supplies.   - Please read the \"Patient Education\" section below for more detailed information. Try implementing tips, reminders, strategies, and supportive management.   - If not yet done, please sign up for [employer] to make a future schedule, send prescription requests, or send messages.    Follow-up Appointment:   Follow-up in 1 year.    PATIENT EDUCATION     Obstructive Sleep Apnea (LEIDA) is a sleep disorder where your upper airway muscles relax during sleep and the airway intermittently and repetitively narrows and collapses leading to partially blocked airway (hypopnea) or completely blocked airway (apnea) which, in turn, can disrupt breathing in sleep, lower oxygen levels while you sleep and cause night time wakings. Because both apnea and hypopnea may cause higher carbon dioxide or low oxygen levels, untreated LEIDA can lead to heart arrhythmia, elevation of blood pressure, and make it harder for the body to consolidate memory and facilitate metabolism (leading to higher blood sugars at night). Frequent partial arousals occur during sleep resulting in sleep deprivation and daytime sleepiness. LEIDA is associated with an increased risk of cardiovascular disease, stroke, hypertension, and insulin resistance. Moreover, untreated LEIDA with excessive daytime sleepiness can increase the risk of motor vehicular accidents.    Some conservative strategies for LEIDA regardless of LEIDA severity are:   Positional therapy - Avoid sleeping on your back.   Healthy diet and regular exercise to optimize weight is highly encouraged.   Avoid alcohol late in the evening and " sedative-hypnotics as these substances can make sleep apnea worse.   Improve breathing through the nose with intranasal steroid spray, saline rinse, or antihistamines    Safety: Avoid driving vehicle and operating heavy equipment while sleepy. Drowsy driving may lead to life-threatening motor vehicle accidents. A person driving while sleepy is 5 times more likely to have an accident. If you feel sleepy, pull over and take a short power nap (sleep for less than 30 minutes). Otherwise, ask somebody to drive you.    Treatment options for sleep apnea include weight management, positional therapy, Positive Airway Therapy (PAP) therapy, oral appliance therapy, hypoglossal nerve stimulator (Inspire) and select airway surgeries.    Annual Reminders About Your Sleep Apnea    Your sleep apnea is well controlled based on reviewing your PAP Data Report.     General Reminders:  Continue current machine settings. Continue using machine every night. Need at least 4 hours daily usage.   Remember to clean your mask, tubings, and water chamber regularly as instructed.   Know the replacement schedule of your supplies/ accessories and contact your DME (durable medical equipment) provider if you are due for them.   Avoid driving or operating heavy machinery when drowsy. A person driving while sleepy is 5 times more likely to have an accident. If you feel sleepy, pull over and take a short power nap (sleep for less than 30 minutes). Otherwise, ask somebody to drive you.    [facility] or calling our office if you have any of the following symptoms:  Snoring or stopping breathing while using the machine  Recurrence of fatigue, sleepiness, insomnia, and other symptoms you had prior using machine  Persistent or worsening fatigue or sleepiness despite regular use of machine  Issues tolerating the machine like bloating sensation, air hunger, too much hot air, too much pressure, taking off mask without recall in the middle  of sleep, etc.     Other conservative measures to improve sleep apnea:  Losing weight can lead to some improvement of LEIDA which means you will need lower pressures in machine to control your LEIDA. In some patients, they don't need to use the machine after bariatric surgery. Hence, consider medical and/or surgical weight loss especially if your BMI is more than 35.  Avoiding alcohol, sedative-hypnotics, and sedating medications is imperative as these substances can worsen snoring and sleep apnea  If you have nasal congestion or seasonal allergies, improving your breathing through the nose is critical for treating sleep apnea, tolerating CPAP, and improving your sleep; hence, using intranasal steroid spray like Flonase might help. Make sure you know the proper way to use it.  Stay off your back when sleeping.    Common issues with PAP machine:  Mask gets dislodged when turning to the side: Consider getting a CPAP pillow or switching to a mask with hose on top.     Dry mouth:  Your machine has built-in humidifier that heats up the air to prevent dry mouth. It can be adjusted to your comfort. You can try that first and increase setting one level one night at a time to check which setting is comfortable and effective in lessening dry mouth. In some patients with heated hose, adjusting tube temperature to make air warmer can improve dry mouth. If dry mouth persists despite adjusting humidity or tube temperature setting, may apply OTC Biotene gel over the gums at bedtime.  If Biotene gel is not effective, consider trying XEROSTOM gel from [employer].  Also, eliminate or reduce dose of medications that can cause dry mouth if possible. Lastly, may try getting a separate room humidifier machine.    Airleaks: Please call DME as they may need to adjust your mask or refit you with a different kind or different size of mask. In addition, you can ask DME for tips on getting a good mask seal and mask fit.     Difficulty tolerating  "the mask: Contact your DME to try a different kind of mask and/or call office to get a referral to Sleep Psychologist for CPAP desensitization. CPAP desensitization technique is a set of strategies that helps patient cope with claustrophobia and anxiety related to wearing mask. Alternatively, we can do a daytime mini-sleep study called PAP-nap trial wherein you will try on different kinds of mask and the sleep technician will try different pressure settings on CPAP and BPAP machines to see which specific pressure is tolerable and comfortable for you.     Water droplets or moisture within the hose and/or mask: This is called rain-out and it is caused by condensation of too much heated humidity on the cooler walls of the hose. If you have rain-out, turn down humidity settings or get a heated hose. If you already have a heated hose, turn up the \"tube temperature\" of the heated hose. Alternatively, if you don't want to get a heated hose or warmer air, may wrap the CPAP hose with stockings to keep it somewhat warm. Also, you need to place the machine on the floor and lower the hose so that water won't travel upward towards your mask.     Maintaining your CPAP/BPAP device:    The humidification chamber (aka water tank or water chamber) needs to be filled with distilled water to prevent buildup of white deposits in the future. If you cannot find distilled water, you can use tap water but expect to have white deposits buildup seen after prolonged use with tap water. If you start seeing white deposits on the water chamber, you can clean it by filling it with equal parts of distilled white vinegar and water. Let the vinegar-water mixture sit for 2 hours, and then rinse it with running tap water. Clean with soap and water then let it dry.     You should try to keep your machine clean in order to work well. Here are some tips to clean PAP supplies / accessories:    Clean the humidification chamber (aka water tank) as well as " your mask and tubing at least once a week with soap and water.   Alternatively, you can fill a sink or basin with warm water and add a little mild detergent, like Ivory dish soap. Gently wipe your supplies with the soapy water to free all the oils and dirt that may have collected. Once that's done, rinse these items with clean water until the soap is gone and let them air dry. You can hang your tubing over the curtain juancho in your bathroom so that it dries.  The mask insert (part of the mask that has contact with your skin) needs to be cleaned with soap and water daily. Another option is to wipe them down with CPAP wipes or baby wipes.    You should replace your mask and tubing frequently in order to prevent bacteria buildup, machine damage, and mask seal issues. The older the mask and hose, the high likelihood that there is bacteria buildup in it especially if they are not cleaned regularly. Dirty filters damage machines because build-up of dust and contaminants can cause machine to over-heat, and in time, damage the motor of machine. Cushions lose their seal over time as most masks are made of plastic and silicone while headgear is made of neoprene. These materials will break down with age and frequent use. Here is the recommended replacement schedule for PAP supplies / accessories:    Twice a month- disposable filters and cushions for nasal mask or nasal pillows.  Once a month- cushion for full face mask  Every 3 months- mask with headgear and PAP tubing (standard or heated hose)  Every 6 months- reusable filter, water chamber, and chin strap     Other useful information:    Some people do not put water in the tank while other people prefers to put water in the tank to prevent mouth dryness. Try to experiment to determine which is more comfortable for you.   In general, new machines have 2 years warranty on parts while health insurance allows you to have a new machine once every 5 years.     You can also go to the  following EDUCATION WEBSITES for further information:   American Academy of Sleep Medicine http://sleepeducation.org  National Sleep Foundation: https://sleepfoundation.org  American Sleep Apnea Association: https://www.sleepapnea.org (for patients with sleep apnea)  Narcolepsy Network: https://www.narcolepsynetwork.org (for patients with narcolepsy)  [employer] inc: https://www.Weelecolepsy.org (for patients with narcolepsy)  Hypersomnia Foundation: https://www.hypersomniafoundation.org (for patients with idiopathic hypersomnia)  RLS foundation: https://www.rls.org (for patients with restless leg syndrome)    IMPORTANT INFORMATION     Call 911 for medical emergencies.  Our offices are generally open from Monday-Friday, 8 am - 5 pm.   There are no supporting services by either the sleep doctors or their staff on weekends and Holidays, or after 5 PM on weekdays.   If you need to get in touch with me, you may either call my office number or you can use "[employer].  If a referral for a test, for CPAP, or for another specialist was made, and you have not heard about scheduling this within a week, please call scheduling at [phone]).  If you are unable to make your appointment for clinic or an overnight study, kindly call the office or sleep testing center at least 48 hours in advance to cancel and reschedule.  If you are on CPAP, please bring your device's card and/or the device to each clinic appointment.   In case of problems with PAP machine or mask interface, please contact your [employer] (Durable Medical Equipment) company first. [employer] is the company who provides you the machine and/or PAP supplies.       PRESCRIPTIONS     We require 7 days advanced notice for prescription refills. If we do not receive the request in this time, we cannot guarantee that your medication will be refilled in time.    IMPORTANT PHONE NUMBERS     Sleep Medicine Clinic Fax: [phone]  Appointments (for Pediatric Sleep Clinic):  [phone] - option 1  Appointments (for Adult Sleep Clinic): [phone] - option 2  Appointments (For Sleep Studies): [phone] - option 3  Behavioral Sleep Medicine: [phone]  Sleep Surgery: [phone]  Nutrition Service: [phone]  Weight management clinics with endocrinology: [phone]  Bariatric Services: [phone] (includes weight loss medications and weight loss surgery)  [facility]: [phone] (offers holistic approaches to weight management)  ENT (Otolaryngology): [phone]  Headache Clinic (Neurology): [phone]  Neurology: [phone]  Psychiatry: [phone]  Pulmonary Function Testing (PFT) Center: [phone]  Pulmonary Medicine: [phone]  [employer]): [phone]      OUR SLEEP TESTING LOCATIONS     Our team will contact you to schedule your sleep study, however, you can contact us as follow:  Main Phone Line (scheduling only): [phone]), option 3  Adult and Pediatric Locations  [facility] (6 years and older): [facility] - 4th floor ([address] [place] [address]) After hours line: [phone]  [facility] (Main campus: All ages): [facility], 6th floor. After hours line: [phone]   [place] (5 years and older; younger considered on case-by-case basis): [address]; [address], [address]. Scheduling  After hours line: [phone]   [place] (6 years and older): [address]; [address]; [address]   [place] (6 years and older): [address] [facility], [address]  After hours line: [phone]   [place] (13 years and older) in [place]: [address] [facility], 2nd floor  After hours line: [phone]   [place] (13 year and older): [address]  After hours line: [phone]     Adult Only Locations:   [place] (18 years and older): [address] [place] [address], 2nd floor   [place] (18  "years and older): [address] [facility]; 4th floor  After hours line: [phone]  [facility] (18 years and older) at [place]: [address] [facility]  After hours line: [phone]     CONTACTING YOUR SLEEP MEDICINE PROVIDER AND SLEEP TEAM      For issues with your machine or mask interface, please call your DME provider first. [employer] for durable medical company. DME is the company who provides you the machine and/or PAP supplies / accessories.   To schedule, cancel, or reschedule SLEEP STUDY APPOINTMENTS, please call the Main Phone Line at [phone] - option 3.   To schedule, cancel, or reschedule CLINIC APPOINTMENTS, you can do it in \"[employer]\", call [phone] to speak with my  (Juju Villafuerte), or call the Main Phone Line at [phone] - option 2  For CLINICAL QUESTIONS or MEDICATION REFILLS, please call direct line for Adult Sleep Nurses at [phone].   Lastly, you can also send a message directly to your provider through \"My Chart\", which is the email service through your  Records Account: [email or web address]       Here at [facility], we wish you a restful sleep!    "

## 2024-02-15 NOTE — PROGRESS NOTES
[facility]  FOLLOW-UP VISIT NOTE    PCP: Yaniv Chaudhary MD    HISTORY OF PRESENT ILLNESS     Patient ID: Zhang Alvarado is a 43 y.o. male who presents for follow-up of LEIDA on PAP, yearly checkup.     Patient is here alone today.  To review, patient's medical history is notable for obesity (BMI 33), HFrEF, GERD, nasal congestion, and LEIDA on CPAP .     Interval History  Patient was last seen in 2/21/2023. Plan was to continue PAP and follow-up yearly.  Since last visit, patient has been using machine every night. Current mask interface being used is full face mask. Per records, patient is getting supplies thru [employer] [facility] [employer]  Patient denies machine problems, mask leak, air hunger, aerophagia, dry mouth, skin irritation, and nasal congestion.   The following are patient's perceived benefits of PAP: no snoring on PAP, refreshing sleep, decreased daytime sleepiness and/or fatigue, decreased nocturnal awakening, decreased nocturia, better quality of sleep, and decreased night sweats .    RLS Follow-up:   Denies    SLEEP STUDY HISTORY (personally reviewed raw data such as interpretation report, data sheet, hypnogram, and titration table if available and applicable)  Split night PSG 11/15/2002: RDI 43.1, SpO2 sue 78%. CPAP was started at 3-11 cm H2O. LEIDA seemed controlled at CPAP 11 cm H2O  PSG 6/22/2022: RDI3% 51.5 (supine RDI3% 65.2, non-supine RDI3% 24.4), RDI4% 47.5 (Supine RDI4% 61.2, non-supine RDI4% 20.6), SpO2 sue 71%, spent 25.6 mins with SpO2<89%. Tried to apply CPAP but patient refused due to CPAP intolerance    SLEEP-WAKE SCHEDULE  Bedtime: 9 PM daily  Sleep latency: 10 minutes  waking times: 2-3x per night to go to bathroom without CPAP, sleeps thru the night on CPAP  Final wakeup time: 6 AM daily  Get out of bed time: 6 AM daily  Naps: 1-2 times daily for 1-2 hours each nap.  Feels rested after a nap: yes (not using CPAP during naps)  Average sleep duration (excluding  "naps): 9 hours    SLEEP ENVIRONMENT  Sleep location: bed  Sleep status: sleeps alone  Preferred sleep position: back    SOCIAL HISTORY  Smoking: No  ETOH: No  Marijuana: No  Caffeine: 1 cup tea daily in the morning  Sleep aids: No    WEIGHT: stable    Claustrophobia: No     Active Problems, Allergy List, Medication List, and PMH/PSH/FH/Social Hx have been reviewed and reconciled in chart. No significant changes unless documented in the pertinent chart section. Updates made when necessary.     PHYSICAL EXAM     VITAL SIGNS: /71   Pulse 88   Temp 36.9 °C (98.4 °F)   Resp 16 Comment: RA  Ht 1.676 m (5' 6\")   Wt 93.4 kg (206 lb)   SpO2 96%   BMI 33.25 kg/m²     NECK CIRCUMFERENCE: 19 inches    Today ESS: 20  Last visit ESS: 11  ROBERTO: 9    Physical Exam  Constitutional: Awake, not in distress  Head: normocephalic, atraumatic  Skin: Warm, no rash  Neuro: No tremors, moves all extremities  Psych: alert and oriented to time, place, and person    RESULTS/DATA     Iron (ug/dL)   Date Value   06/17/2022 63     Iron Saturation (%)   Date Value   06/17/2022 16 (L)     TIBC (ug/dL)   Date Value   06/17/2022 384     Ferritin (ug/L)   Date Value   06/17/2022 106       Bicarbonate   Date Value Ref Range Status   01/11/2024 28 21 - 32 mmol/L Final       PAP Adherence  A PAP adherence data was obtained and reviewed personally today in clinic. (see scanned document in EPIC)  Machine: Danna 2 auto-CPAP  Settings:  auto-CPAP 6-16 cm H2O and Reslex 3  Date Range: 1/9/2024 to 2/7/2024  Days used: 29/30  >4 hrs usage: 97%  Average usage hours (days used): 8 hours 36 minutes  Pressure: Mean 7.2, average 95th percentile 8.7  Leak: average high leak time 0, average leak 8.3  AHI: 0.2 ([place] 0)    ASSESSMENT/PLAN     Zhang Alvarado is a 43 y.o. male who returns in [facility] for the following problems:    OBSTRUCTIVE SLEEP APNEA, SEVERE positional (PSG RDI3% 51.5, RDI4% 47.5 )  SLEEP-RELATED HYPOXEMIA  - " Now on auto-CPAP 6-16 cm H2O and Reslex 3  - Doing well with improved LEIDA symptoms.   - PAP adherence data reviewed today. Usage days 29/30, days> 4 hours at 97%, residual AHI 0.2.   - Continue current machine settings. Continue using machine every night all night.   - Continue supportive management as follows: Lose weight. Stay off your back when sleeping. Avoid smoking, alcohol, and sedating medications if applicable. Don't drive when sleepy.    OBESITY   - BMI 33 today  - Recommend weight loss with diet and exercise.  - Weight loss can help in long term management of LEIDA.  - Defer management to PCP    CONGESTIVE HEART FAILURE  - Denies SOB, chest pain, dizziness and headache  - TTE 1/9/2024: LVEF 45%  - TTE 10/7/2022: LVEF 35-40%  - TTE 6/8/2022: LVEF 25-30 %   - TTE 11/17/2017: LVEF 50-55%  - On med, follows with cardiology      All of patient's questions were answered. He verbalizes understanding and agreement with my assessment and plan. Refer to after-visit-summary (AVS) for patient education and if applicable, for more details on troubleshooting issues with PAP usage, proper cleaning instructions of PAP supplies, and usual recommended replacement schedule for each of the PAP supplies.     Follow-up in 1 year.

## 2024-02-16 DIAGNOSIS — I50.42 CHRONIC COMBINED SYSTOLIC AND DIASTOLIC HEART FAILURE (MULTI): ICD-10-CM

## 2024-02-16 DIAGNOSIS — I10 HYPERTENSION, UNSPECIFIED TYPE: Primary | ICD-10-CM

## 2024-02-16 PROCEDURE — RXMED WILLOW AMBULATORY MEDICATION CHARGE

## 2024-02-19 ENCOUNTER — PHARMACY VISIT (OUTPATIENT)
Dept: PHARMACY | Facility: CLINIC | Age: 44
End: 2024-02-19
Payer: COMMERCIAL

## 2024-02-19 PROCEDURE — RXMED WILLOW AMBULATORY MEDICATION CHARGE

## 2024-02-19 RX ORDER — SPIRONOLACTONE 25 MG/1
12.5 TABLET ORAL DAILY
Qty: 45 TABLET | Refills: 1 | Status: SHIPPED | OUTPATIENT
Start: 2024-02-19

## 2024-02-19 RX ORDER — METOPROLOL SUCCINATE 25 MG/1
25 TABLET, EXTENDED RELEASE ORAL 2 TIMES DAILY
Qty: 180 TABLET | Refills: 3 | Status: SHIPPED | OUTPATIENT
Start: 2024-02-19 | End: 2025-02-18

## 2024-02-20 ENCOUNTER — PHARMACY VISIT (OUTPATIENT)
Dept: PHARMACY | Facility: CLINIC | Age: 44
End: 2024-02-20
Payer: COMMERCIAL

## 2024-02-21 ENCOUNTER — PHARMACY VISIT (OUTPATIENT)
Dept: PHARMACY | Facility: CLINIC | Age: 44
End: 2024-02-21

## 2024-02-22 ENCOUNTER — APPOINTMENT (OUTPATIENT)
Dept: SLEEP MEDICINE | Facility: CLINIC | Age: 44
End: 2024-02-22
Payer: MEDICARE

## 2024-05-06 PROCEDURE — RXMED WILLOW AMBULATORY MEDICATION CHARGE

## 2024-05-06 RX ORDER — FUROSEMIDE 20 MG/1
20 TABLET ORAL DAILY
Qty: 90 TABLET | Refills: 2 | Status: CANCELLED | OUTPATIENT
Start: 2024-05-06 | End: 2025-05-06

## 2024-05-07 ENCOUNTER — PHARMACY VISIT (OUTPATIENT)
Dept: PHARMACY | Facility: CLINIC | Age: 44
End: 2024-05-07
Payer: COMMERCIAL

## 2024-05-07 DIAGNOSIS — I50.42 CHRONIC COMBINED SYSTOLIC AND DIASTOLIC HEART FAILURE (MULTI): Primary | ICD-10-CM

## 2024-05-07 RX ORDER — FUROSEMIDE 20 MG/1
20 TABLET ORAL DAILY
Qty: 90 TABLET | Refills: 2 | Status: CANCELLED | OUTPATIENT
Start: 2024-05-06 | End: 2025-05-06

## 2024-05-09 PROCEDURE — RXMED WILLOW AMBULATORY MEDICATION CHARGE

## 2024-05-09 RX ORDER — FUROSEMIDE 20 MG/1
20 TABLET ORAL DAILY
Qty: 90 TABLET | Refills: 1 | Status: SHIPPED | OUTPATIENT
Start: 2024-05-09 | End: 2025-05-09

## 2024-05-10 ENCOUNTER — PHARMACY VISIT (OUTPATIENT)
Dept: PHARMACY | Facility: CLINIC | Age: 44
End: 2024-05-10
Payer: COMMERCIAL

## 2024-05-21 PROCEDURE — RXMED WILLOW AMBULATORY MEDICATION CHARGE

## 2024-05-24 ENCOUNTER — PHARMACY VISIT (OUTPATIENT)
Dept: PHARMACY | Facility: CLINIC | Age: 44
End: 2024-05-24
Payer: COMMERCIAL

## 2024-06-05 PROCEDURE — RXMED WILLOW AMBULATORY MEDICATION CHARGE

## 2024-06-07 ENCOUNTER — PHARMACY VISIT (OUTPATIENT)
Dept: PHARMACY | Facility: CLINIC | Age: 44
End: 2024-06-07
Payer: COMMERCIAL

## 2024-06-10 ENCOUNTER — OFFICE VISIT (OUTPATIENT)
Dept: PRIMARY CARE | Facility: CLINIC | Age: 44
End: 2024-06-10
Payer: COMMERCIAL

## 2024-06-10 VITALS
DIASTOLIC BLOOD PRESSURE: 78 MMHG | BODY MASS INDEX: 34.39 KG/M2 | HEART RATE: 84 BPM | HEIGHT: 66 IN | OXYGEN SATURATION: 97 % | TEMPERATURE: 98 F | SYSTOLIC BLOOD PRESSURE: 116 MMHG | WEIGHT: 214 LBS

## 2024-06-10 DIAGNOSIS — K59.04 CHRONIC IDIOPATHIC CONSTIPATION: Primary | ICD-10-CM

## 2024-06-10 DIAGNOSIS — I10 PRIMARY HYPERTENSION: ICD-10-CM

## 2024-06-10 DIAGNOSIS — Z00.00 MEDICARE ANNUAL WELLNESS VISIT, SUBSEQUENT: ICD-10-CM

## 2024-06-10 DIAGNOSIS — I25.10 CORONARY ARTERY DISEASE INVOLVING NATIVE CORONARY ARTERY OF NATIVE HEART WITHOUT ANGINA PECTORIS: ICD-10-CM

## 2024-06-10 DIAGNOSIS — E66.9 OBESITY (BMI 30-39.9): ICD-10-CM

## 2024-06-10 DIAGNOSIS — E78.2 MIXED HYPERLIPIDEMIA: ICD-10-CM

## 2024-06-10 PROBLEM — G25.81 RESTLESS LEGS SYNDROME: Status: ACTIVE | Noted: 2024-06-10

## 2024-06-10 PROBLEM — K21.9 GASTROESOPHAGEAL REFLUX DISEASE: Status: ACTIVE | Noted: 2024-06-10

## 2024-06-10 PROBLEM — F63.9 IMPULSE DISORDER, UNSPECIFIED: Status: ACTIVE | Noted: 2022-03-08

## 2024-06-10 PROCEDURE — 3074F SYST BP LT 130 MM HG: CPT | Performed by: FAMILY MEDICINE

## 2024-06-10 PROCEDURE — G0439 PPPS, SUBSEQ VISIT: HCPCS | Performed by: FAMILY MEDICINE

## 2024-06-10 PROCEDURE — 1036F TOBACCO NON-USER: CPT | Performed by: FAMILY MEDICINE

## 2024-06-10 PROCEDURE — 99213 OFFICE O/P EST LOW 20 MIN: CPT | Performed by: FAMILY MEDICINE

## 2024-06-10 PROCEDURE — 3078F DIAST BP <80 MM HG: CPT | Performed by: FAMILY MEDICINE

## 2024-06-10 RX ORDER — POLYETHYLENE GLYCOL 3350 17 G/17G
17 POWDER, FOR SOLUTION ORAL DAILY
Qty: 100 PACKET | Refills: 1 | Status: SHIPPED | OUTPATIENT
Start: 2024-06-10 | End: 2024-12-27

## 2024-06-10 RX ORDER — SENNOSIDES 8.6 MG/1
1 TABLET ORAL DAILY
Qty: 90 TABLET | Refills: 2 | Status: SHIPPED | OUTPATIENT
Start: 2024-06-10 | End: 2025-03-07

## 2024-06-10 ASSESSMENT — ACTIVITIES OF DAILY LIVING (ADL)
BATHING: NEEDS ASSISTANCE
TAKING_MEDICATION: NEEDS ASSISTANCE
DRESSING: NEEDS ASSISTANCE
GROCERY_SHOPPING: TOTAL CARE
DOING_HOUSEWORK: NEEDS ASSISTANCE
MANAGING_FINANCES: TOTAL CARE

## 2024-06-10 ASSESSMENT — PATIENT HEALTH QUESTIONNAIRE - PHQ9
1. LITTLE INTEREST OR PLEASURE IN DOING THINGS: NOT AT ALL
2. FEELING DOWN, DEPRESSED OR HOPELESS: NOT AT ALL
SUM OF ALL RESPONSES TO PHQ9 QUESTIONS 1 AND 2: 0

## 2024-06-10 NOTE — PROGRESS NOTES
Subjective   Patient ID: Zhang Alvarado is a 44 y.o. male who presents for Medicare Annual Wellness Visit Subsequent ([employer]).  HPI    CHF: sees cardiology    Constipation: has a lot of large, hard stools.    Preparing meals - dependent  Using telephone - independent  Bladder - incontinent at times in the morning  Bowel - continent    Recent hospital stays - none    ADLs - unable able to dress, walk and bath independently  Instrumental ADL's - unable to shop, maintain finances, managing medications, housekeeping independently    Depression: PHQ-2 (screening 2 mins) - negative    Opioid use -   none   Exercise -  does bocce  Diet - well balanced  Pain score - 0/10    Providers - sees cardiology    [employer] dementia screen - NA    Education - educated pt on healthy eating, exercise    Falls - no falls    Counseled pt on living will    AAA screening: NA     Prostate CA screen:  NA    CV screening: sees cardiology    Colonoscopy:  NA    Diabetes screening:  Neg    Glaucoma screen:  NA    CT chest tob screen: NA    Vaccines: none      Patient Active Problem List   Diagnosis    Hypertension    Hyperlipemia    Chronic combined systolic and diastolic heart failure (Multi)    Coronary artery disease involving native coronary artery of native heart    Developmental disability    LEIDA on CPAP    Obesity (BMI 30-39.9)    Gastroesophageal reflux disease    Impulse disorder, unspecified    Restless legs syndrome       Social Connections: Not on File (5/22/2021)    Received from [employer]     Social Connections and Isolation: 0       Current Outpatient Medications on File Prior to Visit   Medication Sig Dispense Refill    aspirin 81 mg EC tablet Take by mouth.      atorvastatin (Lipitor) 20 mg tablet       cholecalciferol (Vitamin D-3) 25 MCG (1000 UT) capsule Take 1 capsule (25 mcg) by mouth once daily.      desipramine (Norpramin) 50 mg tablet Take 1 tablet (50 mg) by mouth once daily at bedtime.       furosemide (Lasix) 20 mg tablet TAKE 1 TABLET BY MOUTH ONCE DAILY 90 tablet 1    metoprolol succinate XL (Toprol-XL) 25 mg 24 hr tablet TAKE 1 TABLET BY MOUTH TWO TIMES A  tablet 3    multivitamin (Daily Multi-Vitamin) tablet Take by mouth once daily.      risperiDONE (RisperDAL) 0.5 mg tablet Take 1 tablet (0.5 mg) by mouth 2 times a day.      sacubitriL-valsartan (Entresto) 24-26 mg tablet Take 1 tablet by mouth twice a day. 60 tablet 11    sacubitriL-valsartan (Entresto) 24-26 mg tablet TAKE 1 TABLET BY MOUTH TWICE A  tablet 2    spironolactone (Aldactone) 25 mg tablet Take 0.5 tablets (12.5 mg) by mouth once daily. 45 tablet 1    [DISCONTINUED] atorvastatin (Lipitor) 20 mg tablet TAKE 1 TABLET BY MOUTH AT BEDTIME 90 tablet 1    [DISCONTINUED] furosemide (Lasix) 20 mg tablet 1 tablet (20 mg).      [DISCONTINUED] metoprolol succinate XL (Toprol-XL) 25 mg 24 hr tablet       sacubitriL-valsartan (Entresto) 24-26 mg tablet TAKE 1 TABLET BY MOUTH TWICE A  tablet 1     No current facility-administered medications on file prior to visit.        Vitals:    06/10/24 0808   BP: 116/78   Pulse: 84   Temp: 36.7 °C (98 °F)   SpO2: 97%     Vitals:    06/10/24 0808   Weight: 97.1 kg (214 lb)       Review of Systems   All other systems reviewed and are negative.      Objective     Physical Exam  Vitals reviewed.   Constitutional:       General: He is not in acute distress.     Appearance: Normal appearance. He is well-developed. He is not diaphoretic.   HENT:      Head: Normocephalic and atraumatic.      Right Ear: Tympanic membrane normal.      Left Ear: Tympanic membrane normal.      Nose: Nose normal.      Mouth/Throat:      Mouth: Mucous membranes are moist.   Eyes:      Pupils: Pupils are equal, round, and reactive to light.   Cardiovascular:      Rate and Rhythm: Normal rate and regular rhythm.      Heart sounds: Normal heart sounds. No murmur heard.     No friction rub. No gallop.   Pulmonary:       Effort: Pulmonary effort is normal.      Breath sounds: Normal breath sounds. No rales.   Abdominal:      General: Bowel sounds are normal.      Palpations: Abdomen is soft.      Tenderness: There is no abdominal tenderness.   Musculoskeletal:      Cervical back: Normal range of motion and neck supple.   Skin:     General: Skin is warm and dry.   Neurological:      Mental Status: He is alert.   Psychiatric:         Mood and Affect: Mood normal.         No visits with results within 2 Month(s) from this visit.   Latest known visit with results is:   Hospital Outpatient Visit on 01/17/2024   Component Date Value Ref Range Status    LVOT diam 01/17/2024 2.30   Final    LV EF 01/17/2024 45   Final    MV E/A ratio 01/17/2024 1.02   Final    MV avg E/e' ratio 01/17/2024 8.20   Final    Tricuspid annular plane systolic e* 01/17/2024 2.0   Final    LA vol index A/L 01/17/2024 26.8   Final    RV free wall pk S' 01/17/2024 12.50   Final    LVIDd 01/17/2024 6.10   Final    LV A4C EF 01/17/2024 46.8   Final       Assessment/Plan   Problem List Items Addressed This Visit             ICD-10-CM    Hypertension I10    Hyperlipemia E78.5    Coronary artery disease involving native coronary artery of native heart I25.10    Obesity (BMI 30-39.9) E66.9     Other Visit Diagnoses         Codes    Chronic idiopathic constipation    -  Primary K59.04    Relevant Medications    polyethylene glycol (Glycolax, Miralax) 17 gram packet    sennosides (senna) 8.6 mg tablet    Medicare annual wellness visit, subsequent     Z00.00          Try adding on Miralax and Senna for more regular stools.  Can try  holding lasix for 2 nights for enuresis to see if it improves.  Consider having a urinal to have at bedside.

## 2024-06-12 DIAGNOSIS — E78.2 MIXED HYPERLIPIDEMIA: Primary | ICD-10-CM

## 2024-06-13 PROCEDURE — RXMED WILLOW AMBULATORY MEDICATION CHARGE

## 2024-06-13 RX ORDER — ATORVASTATIN CALCIUM 20 MG/1
TABLET, FILM COATED ORAL
Qty: 90 TABLET | Refills: 1 | Status: SHIPPED | OUTPATIENT
Start: 2024-06-13

## 2024-06-14 ENCOUNTER — PHARMACY VISIT (OUTPATIENT)
Dept: PHARMACY | Facility: CLINIC | Age: 44
End: 2024-06-14
Payer: COMMERCIAL

## 2024-07-17 ENCOUNTER — APPOINTMENT (OUTPATIENT)
Dept: CARDIOLOGY | Facility: HOSPITAL | Age: 44
End: 2024-07-17
Payer: MEDICARE

## 2024-07-17 ENCOUNTER — LAB (OUTPATIENT)
Dept: LAB | Facility: LAB | Age: 44
End: 2024-07-17
Payer: COMMERCIAL

## 2024-07-17 DIAGNOSIS — G47.33 OSA (OBSTRUCTIVE SLEEP APNEA): ICD-10-CM

## 2024-07-17 DIAGNOSIS — E66.9 OBESITY (BMI 30-39.9): ICD-10-CM

## 2024-07-17 DIAGNOSIS — E78.2 MIXED HYPERLIPIDEMIA: ICD-10-CM

## 2024-07-17 DIAGNOSIS — F89 DEVELOPMENTAL DISABILITY: ICD-10-CM

## 2024-07-17 DIAGNOSIS — I25.10 CORONARY ARTERY DISEASE INVOLVING NATIVE CORONARY ARTERY OF NATIVE HEART, UNSPECIFIED WHETHER ANGINA PRESENT: ICD-10-CM

## 2024-07-17 DIAGNOSIS — I50.42 CHRONIC COMBINED SYSTOLIC AND DIASTOLIC HEART FAILURE (MULTI): ICD-10-CM

## 2024-07-17 LAB
ALBUMIN SERPL BCP-MCNC: 4.4 G/DL (ref 3.4–5)
ALP SERPL-CCNC: 41 U/L (ref 33–120)
ALT SERPL W P-5'-P-CCNC: 23 U/L (ref 10–52)
ANION GAP SERPL CALC-SCNC: 9 MMOL/L (ref 10–20)
AST SERPL W P-5'-P-CCNC: 15 U/L (ref 9–39)
BILIRUB SERPL-MCNC: 0.5 MG/DL (ref 0–1.2)
BUN SERPL-MCNC: 15 MG/DL (ref 6–23)
CALCIUM SERPL-MCNC: 9.1 MG/DL (ref 8.6–10.3)
CHLORIDE SERPL-SCNC: 103 MMOL/L (ref 98–107)
CHOLEST SERPL-MCNC: 123 MG/DL (ref 0–199)
CHOLESTEROL/HDL RATIO: 3.2
CO2 SERPL-SCNC: 30 MMOL/L (ref 21–32)
CREAT SERPL-MCNC: 1.17 MG/DL (ref 0.5–1.3)
EGFRCR SERPLBLD CKD-EPI 2021: 79 ML/MIN/1.73M*2
ERYTHROCYTE [DISTWIDTH] IN BLOOD BY AUTOMATED COUNT: 13.2 % (ref 11.5–14.5)
GLUCOSE SERPL-MCNC: 96 MG/DL (ref 74–99)
HCT VFR BLD AUTO: 42.4 % (ref 41–52)
HDLC SERPL-MCNC: 38.6 MG/DL
HGB BLD-MCNC: 14.5 G/DL (ref 13.5–17.5)
LDLC SERPL CALC-MCNC: 44 MG/DL
MAGNESIUM SERPL-MCNC: 1.93 MG/DL (ref 1.6–2.4)
MCH RBC QN AUTO: 30.3 PG (ref 26–34)
MCHC RBC AUTO-ENTMCNC: 34.2 G/DL (ref 32–36)
MCV RBC AUTO: 89 FL (ref 80–100)
NON HDL CHOLESTEROL: 84 MG/DL (ref 0–149)
NRBC BLD-RTO: 0 /100 WBCS (ref 0–0)
PLATELET # BLD AUTO: 205 X10*3/UL (ref 150–450)
POTASSIUM SERPL-SCNC: 4.3 MMOL/L (ref 3.5–5.3)
PROT SERPL-MCNC: 6.8 G/DL (ref 6.4–8.2)
RBC # BLD AUTO: 4.78 X10*6/UL (ref 4.5–5.9)
SODIUM SERPL-SCNC: 138 MMOL/L (ref 136–145)
TRIGL SERPL-MCNC: 202 MG/DL (ref 0–149)
VLDL: 40 MG/DL (ref 0–40)
WBC # BLD AUTO: 6.7 X10*3/UL (ref 4.4–11.3)

## 2024-07-17 PROCEDURE — 80053 COMPREHEN METABOLIC PANEL: CPT

## 2024-07-17 PROCEDURE — 83735 ASSAY OF MAGNESIUM: CPT

## 2024-07-17 PROCEDURE — 85027 COMPLETE CBC AUTOMATED: CPT

## 2024-07-20 NOTE — PROGRESS NOTES
[facility]    Patient Name: Zhang Alvarado  Patient : 1980      Scribe Attestation  By signing my name below, I, Marly Grandaibsophia   attest that this documentation has been prepared under the direction and in the presence of Rajesh Kumari DO.      Reason for visit:  This is a 44-year-old male here for follow-up regarding chronic systolic and diastolic heart failure with an ejection fraction of 45%, minimal coronary artery disease as seen on cardiac catheterization done in 2022, dyslipidemia, obstructive sleep apnea, developmental disability, and obesity.     HPI:  This is a 44-year-old male here for follow-up regarding chronic systolic and diastolic heart failure with an ejection fraction of 45%, minimal coronary artery disease as seen on cardiac catheterization done in 2022, dyslipidemia, obstructive sleep apnea, developmental disability, and obesity.  The patient was last evaluated by me in 2024.  At that visit I ordered blood work including CMP/lipid/magnesium/CBC to be drawn in 6 months, and asked the patient to follow-up in 6 months and sooner if necessary. CMP done on 24 showed normal serum sodium and serum potassium and a serum creatinine of 1.17, normal ALT and AST. Serum magnesium was 1.93. CBC showed a hemoglobin of 14.5. Lipid panel done in 2024 showed an LDL cholesterol of 44 and triglycerides of 202 currently on atorvastatin 20 mg daily. ECG done today showed sinus rhythm with a heart rate of 91 bpm.  The patient reports that he has been feeling generally well from the cardiac standpoint. He denies any new chest pain, shortness of breath, palpitations and lightheadedness. His mother states that the patient had been doing well from the heart perspective. He states that he takes all of his medications as prescribed. During my exam, he was resting comfortably on the exam table.            Assessment/Plan:   1. Chronic  systolic and diastolic heart failure  The patient has a history of systolic and diastolic heart failure with an ejection fraction of 45%.  Echocardiogram done in January 2024 showed mildly reduced left ventricular systolic function with an ejection fraction of 45%, grade 2 diastolic dysfunction, normal right ventricular systolic function, no significant valve abnormalities.    He does not appear significantly volume overloaded on exam today.   He should continue current heart failure medications.  Recent lab works as noted in the HPI.   Lab works as noted below will be done in 6 months prior to his next visit.  I discussed with him the importance of following a low-sodium heart healthy diet as well as weight loss.  Echocardiogram in 6 months  Follow up in 6 months and sooner if necessary.      2. Coronary artery disease  The patient has a history of minimal coronary artery disease as seen on cardiac catheterization done in June 2022.  ECG done today showed sinus rhythm with a heart rate of 91 bpm.    He denies anginal chest discomfort.  Blood pressure appears controlled on exam today.  He should continue current antihypertensive medications and antiplatelet therapy.  Echocardiogram done in January 2024 showed mildly reduced left ventricular systolic function with an ejection fraction of 45%, grade 2 diastolic dysfunction, normal right ventricular systolic function, no significant valve abnormalities.    Recent lab works as noted in the HPI.  Lipid panel done in July 2024 showed an LDL cholesterol of 44 and triglycerides of 202 currently on atorvastatin 20 mg daily.   Lab works will be done today and again in 6 months prior to the next visit.   Please see lifestyle recommendations below.   Follow up in 6 months and sooner if necessary.      3. Dyslipidemia  Lipid panel done in July 2024 showed an LDL cholesterol of 44 and triglycerides of 202 currently on atorvastatin 20 mg daily.   Please see lifestyle  recommendations below.     4. Hypertension  The patient has a history of hypertension which appears controlled on exam today.  He should continue his current antihypertensive medications and monitor his blood pressure at home.      5. Developmental disability  Management as per PCP.      6. Obstructive sleep apnea  The patient has a history of obstructive sleep apnea.  He should continue to follow up with Sleep Medicine.     7. Obesity  Please see lifestyle recommendations below.       Orders:  Echocardiogram in 6 months  BMP/BNP/magnesium in 6 months,   Follow-up in 6 months.    Lifestyle Recommendations  I recommend a whole-food plant-based diet, an eating pattern that encourages the consumption of unrefined plant foods (such as fruits, vegetables, tubers, whole grains, legumes, nuts and seeds) and discourages meats, dairy products, eggs and processed foods.     The AHA/ACC recommends that the patient consume a dietary pattern that emphasizes intake of vegetables, fruits, and whole grains; includes low-fat dairy products, poultry, fish, legumes, non-tropical vegetable oils, and nuts; and limits intake of sodium, sweets, sugar-sweetened beverages, and red meats.  Adapt this dietary pattern to appropriate calorie requirements (a 500-750 kcal/day deficit to loose weight), personal and cultural food preferences, and nutrition therapy for other medical conditions (including diabetes).  Achieve this pattern by following plans such as the [place] Mediterranean, DASH dietary pattern, or AHA diet.     Engage in 2 hours and 30 minutes per week of moderate-intensity physical activity, or 1 hour and 15 minutes (75 minutes) per week of vigorous-intensity aerobic physical activity, or an equivalent combination of moderate and vigorous-intensity aerobic physical activity. Aerobic activity should be performed in episodes of at least 10 minutes preferably spread throughout the week.     Adhering to a heart healthy diet, regular  exercise habits, avoidance of tobacco products, and maintenance of a healthy weight are crucial components of their heart disease risk reduction.     Any positive review of systems not specifically addressed in the office visit today should be evaluated and treated by the patients primary care physician or in an emergency department if necessary     Patient was notified that results from ordered tests will be called to the patient if it changes current management; it will otherwise be discussed at a future appointment and available on [facility].     Thank you for allowing me to participate in the care of this patient.        This document was generated using the assistance of voice recognition software. If there are any errors of spelling, grammar, syntax, or meaning; please feel free to contact me directly for clarification.    Past Medical History:  He has a past medical history of Disturbances of salivary secretion, Other conditions influencing health status, Personal history of diseases of the skin and subcutaneous tissue, Personal history of other diseases of the circulatory system, and Raynaud's syndrome without gangrene.    Past Surgical History:  He has a past surgical history that includes Other surgical history (04/05/2019).      Social History:  He reports that he has never smoked. He has never used smokeless tobacco. He reports that he does not drink alcohol and does not use drugs.    Family History:  No family history on file.     Allergies:  Augmentin [amoxicillin-pot clavulanate], Cephalexin, and Penicillins    Outpatient Medications:  Current Outpatient Medications   Medication Instructions    aspirin 81 mg EC tablet oral    atorvastatin (Lipitor) 20 mg tablet Take one tablet by mouth daily at bedtime.    cholecalciferol (Vitamin D-3) 25 MCG (1000 UT) capsule 1 capsule, oral, Daily    desipramine (NORPRAMIN) 50 mg, oral, Nightly    furosemide (Lasix) 20 mg tablet TAKE 1 TABLET BY MOUTH ONCE DAILY     "metoprolol succinate XL (Toprol-XL) 25 mg 24 hr tablet TAKE 1 TABLET BY MOUTH TWO TIMES A DAY    multivitamin (Daily Multi-Vitamin) tablet oral, Daily RT    polyethylene glycol (GLYCOLAX, MIRALAX) 17 g, oral, Daily, Mix 1 cap (17g) into 8 ounces of fluid.    risperiDONE (RISPERDAL) 0.5 mg, oral, 2 times daily    sacubitriL-valsartan (Entresto) 24-26 mg tablet 1 tablet, oral, 2 times daily    sacubitriL-valsartan (Entresto) 24-26 mg tablet TAKE 1 TABLET BY MOUTH TWICE A DAY    sacubitriL-valsartan (Entresto) 24-26 mg tablet TAKE 1 TABLET BY MOUTH TWICE A DAY    sennosides (SENNA) 8.6 mg, oral, Daily    spironolactone (ALDACTONE) 12.5 mg, oral, Daily        ROS:  A 14 point review of systems was done and is negative other than as stated in HPI    Vitals:      12/14/2022     3:07 PM 2/21/2023     3:42 PM 5/24/2023     3:03 PM 7/20/2023     2:19 PM 1/24/2024     4:03 PM 2/15/2024     3:56 PM 6/10/2024     8:08 AM   Vitals   Systolic 120 125 118 120 126 115 116   Diastolic 76 80 70 78 90 71 78   Heart Rate 82 83 88 84 91 88 84   Temp  36.3 °C (97.3 °F) 36 °C (96.8 °F)   36.9 °C (98.4 °F) 36.7 °C (98 °F)   Resp  16    16    Height (in) 1.626 m (5' 4\") 1.626 m (5' 4\")  1.626 m (5' 4\") 1.676 m (5' 6\") 1.676 m (5' 6\") 1.676 m (5' 6\")   Weight (lb) 204 207.7  203.38 209.6 206 214   BMI 35.02 kg/m2 35.65 kg/m2  34.91 kg/m2 33.83 kg/m2 33.25 kg/m2 34.54 kg/m2   BSA (m2) 2.04 m2 2.06 m2  2.04 m2 2.1 m2 2.09 m2 2.13 m2        Physical Exam:   Constitutional: Cooperative, in no acute distress, alert, appears stated age.   Skin: Skin color, texture, turgor normal. No rashes or lesions.   Head: Normocephalic. No masses, lesions, tenderness or abnormalities   Eyes: Extraocular movements are grossly intact.   Mouth and throat: Mucous membranes moist   Neck: Neck supple, no carotid bruits, no JVD   Respiratory: Lungs clear to auscultation, no wheezing or rhonchi, no use of accessory muscles   Chest wall: No scars, normal excursion with " respiration   Cardiovascular: Regular rhythm without murmur, gallop, or rubs   Gastrointestinal: Abdomen soft, nontender. Bowel sounds normal. Obese.  Musculoskeletal: Strength equal in upper extremities   Extremities: No pitting edema   Neurologic: Sensation grossly intact, alert and oriented x3      Intake/Output:   No intake/output data recorded.    Outpatient Medications  Current Outpatient Medications on File Prior to Visit   Medication Sig Dispense Refill    aspirin 81 mg EC tablet Take by mouth.      atorvastatin (Lipitor) 20 mg tablet Take one tablet by mouth daily at bedtime. 90 tablet 1    cholecalciferol (Vitamin D-3) 25 MCG (1000 UT) capsule Take 1 capsule (25 mcg) by mouth once daily.      desipramine (Norpramin) 50 mg tablet Take 1 tablet (50 mg) by mouth once daily at bedtime.      furosemide (Lasix) 20 mg tablet TAKE 1 TABLET BY MOUTH ONCE DAILY 90 tablet 1    metoprolol succinate XL (Toprol-XL) 25 mg 24 hr tablet TAKE 1 TABLET BY MOUTH TWO TIMES A  tablet 3    multivitamin (Daily Multi-Vitamin) tablet Take by mouth once daily.      polyethylene glycol (Glycolax, Miralax) 17 gram packet Take 17 g by mouth once daily. Mix 1 cap (17g) into 8 ounces of fluid. 100 packet 1    risperiDONE (RisperDAL) 0.5 mg tablet Take 1 tablet (0.5 mg) by mouth 2 times a day.      sacubitriL-valsartan (Entresto) 24-26 mg tablet Take 1 tablet by mouth twice a day. 60 tablet 11    sacubitriL-valsartan (Entresto) 24-26 mg tablet TAKE 1 TABLET BY MOUTH TWICE A  tablet 2    sacubitriL-valsartan (Entresto) 24-26 mg tablet TAKE 1 TABLET BY MOUTH TWICE A  tablet 1    sennosides (senna) 8.6 mg tablet Take 1 tablet (8.6 mg) by mouth once daily. 90 tablet 2    spironolactone (Aldactone) 25 mg tablet Take 0.5 tablets (12.5 mg) by mouth once daily. 45 tablet 1     No current facility-administered medications on file prior to visit.       Labs: (past 26 weeks)  Recent Results (from the past 4368 hour(s))    Comprehensive Metabolic Panel    Collection Time: 07/17/24  7:59 AM   Result Value Ref Range    Glucose 96 74 - 99 mg/dL    Sodium 138 136 - 145 mmol/L    Potassium 4.3 3.5 - 5.3 mmol/L    Chloride 103 98 - 107 mmol/L    Bicarbonate 30 21 - 32 mmol/L    Anion Gap 9 (L) 10 - 20 mmol/L    Urea Nitrogen 15 6 - 23 mg/dL    Creatinine 1.17 0.50 - 1.30 mg/dL    eGFR 79 >60 mL/min/1.73m*2    Calcium 9.1 8.6 - 10.3 mg/dL    Albumin 4.4 3.4 - 5.0 g/dL    Alkaline Phosphatase 41 33 - 120 U/L    Total Protein 6.8 6.4 - 8.2 g/dL    AST 15 9 - 39 U/L    Bilirubin, Total 0.5 0.0 - 1.2 mg/dL    ALT 23 10 - 52 U/L   Lipid Panel    Collection Time: 07/17/24  7:59 AM   Result Value Ref Range    Cholesterol 123 0 - 199 mg/dL    HDL-Cholesterol 38.6 mg/dL    Cholesterol/HDL Ratio 3.2     LDL Calculated 44 <=99 mg/dL    VLDL 40 0 - 40 mg/dL    Triglycerides 202 (H) 0 - 149 mg/dL    Non HDL Cholesterol 84 0 - 149 mg/dL   Magnesium    Collection Time: 07/17/24  7:59 AM   Result Value Ref Range    Magnesium 1.93 1.60 - 2.40 mg/dL   CBC    Collection Time: 07/17/24  7:59 AM   Result Value Ref Range    WBC 6.7 4.4 - 11.3 x10*3/uL    nRBC 0.0 0.0 - 0.0 /100 WBCs    RBC 4.78 4.50 - 5.90 x10*6/uL    Hemoglobin 14.5 13.5 - 17.5 g/dL    Hematocrit 42.4 41.0 - 52.0 %    MCV 89 80 - 100 fL    MCH 30.3 26.0 - 34.0 pg    MCHC 34.2 32.0 - 36.0 g/dL    RDW 13.2 11.5 - 14.5 %    Platelets 205 150 - 450 x10*3/uL       ECG  Encounter Date: 01/24/24   ECG 12 Lead    Narrative    Sinus rhythm, nonspecific T wave abnormality, heart rate 91 bpm.       Echocardiogram  No results found for this or any previous visit from the past 1095 days.      CV Studies:  EKG:  Encounter Date: 01/24/24   ECG 12 Lead    Narrative    Sinus rhythm, nonspecific T wave abnormality, heart rate 91 bpm.     Echocardiogram:   Echocardiogram     Narrative  [place] [id number]  Phone [phone] Fax [phone]    TRANSTHORACIC  ECHOCARDIOGRAM REPORT      Patient Name:     JAVIER RIOS  Reading Physician:   [id number] Anthony Herman MD  Study Date:       10/7/2022    Referring Physician: [id number] BENIGNO CROSS  MRN/PID:          [id number]     PCP:  Accession/Order#: [id number] Department Location: [facility] Echo Lab  YOB: 1980    Fellow:  Gender:           M            Nurse:  Admit Date:                    Sonographer:         Brigid Fischer RD  Admission Status: Outpatient   Additional Staff:  Height:           162.56 cm    CC Report to:  Weight:           92.53 kg     Study Type:          Echocardiogram  BSA:              1.97 m2  Blood Pressure: 118 /74 mmHg    Diagnosis/ICD: I50.40-Unspecified combined systolic (congestive) and diastolic  (congestive) heart failure (CHF); I25.10-Atherosclerotic heart  disease of native coronary artery without angina pectoris  Indication:    CAD, CHF  Procedure/CPT: Echo Complete w Full [id number]    Patient History:  Pertinent History: CAD, CHF and Dyslipidemia.    Study Detail: The following Echo studies were performed: 2D, M-Mode, Doppler and  color flow. Technically challenging study due to body habitus.      PHYSICIAN INTERPRETATION:  Left Ventricle: Left ventricular systolic function is moderately decreased, with an estimated ejection fraction of 35-40%. There is global hypokinesis of the left ventricle with minor regional variations. The left ventricular cavity size is normal. There is moderate concentric left ventricular hypertrophy. Spectral Doppler shows a restrictive pattern of left ventricular diastolic filling.  Left Atrium: The left atrium is normal in size.  Right Ventricle: The right ventricle is upper limits of normal in size. There is mildly reduced right ventricular systolic function.  Right Atrium: The right atrium is normal in size.  Aortic Valve: The aortic valve appears structurally normal. There is no evidence of aortic valve regurgitation.  Mitral Valve: The mitral  valve is normal in structure. There is no evidence of mitral valve regurgitation.  Tricuspid Valve: The tricuspid valve is structurally normal. There is mild tricuspid regurgitation.  Pulmonic Valve: The pulmonic valve is structurally normal. There is no indication of pulmonic valve regurgitation.  Pericardium: There is no pericardial effusion noted.  Aorta: The aortic root is normal.      CONCLUSIONS:  1. Left ventricular systolic function is moderately decreased with a 35-40% estimated ejection fraction.  2. Spectral Doppler shows a restrictive pattern of left ventricular diastolic filling.  3. There is moderate concentric left ventricular hypertrophy.  4. There is mildly reduced right ventricular systolic function.  5. There is global hypokinesis of the left ventricle with minor regional variations.    QUANTITATIVE DATA SUMMARY:  2D MEASUREMENTS:  Normal Ranges:  Ao Root d:     3.30 cm    (2.0-3.7cm)  IVSd:          1.40 cm    (0.6-1.1cm)  LVPWd:         1.40 cm    (0.6-1.1cm)  LVIDd:         6.30 cm    (3.9-5.9cm)  LVIDs:         5.70 cm  LV Mass Index: 212.7 g/m2  LV % FS        9.5 %    LA VOLUME:  Normal Ranges:  LA Vol A4C:        41.6 ml    (22+/-6mL/m2)  LA Vol A2C:        41.6 ml  LA Vol BP:         41.6 ml  LA Vol Index A4C:  21.1ml/m2  LA Vol Index A2C:  21.1 ml/m2  LA Vol Index BP:   21.1 ml/m2  LA Area A4C:       14.0 cm2  LA Area A2C:       14.0 cm2  LA Major Axis A4C: 4.0 cm  LA Major Axis A2C: 4.0 cm  LA Volume Index:   21.0 ml/m2    RA VOLUME BY A/L METHOD:  Normal Ranges:  RA Area A4C: 10.0 cm2    M-MODE MEASUREMENTS:  Normal Ranges:  Ao Root: 3.20 cm (2.0-3.7cm)  AoV Exc: 2.40 cm (1.5-2.5cm)  LAs:     3.30 cm (2.7-4.0cm)    AORTA MEASUREMENTS:  Normal Ranges:  AoV Exc:   2.40 cm (1.5-2.5cm)  Asc Ao, d: 2.50 cm (2.1-3.4cm)    LV SYSTOLIC FUNCTION BY 2D PLANIMETRY (MOD):  Normal Ranges:  EF-A4C View: 40.9 % (>55%)  EF-A2C View: 33.8 %  EF-Biplane:  37.9 %    LV DIASTOLIC FUNCTION:  Normal  Ranges:  MV Peak E:    0.76 m/s   (0.7-1.2 m/s)  MV Peak A:    0.40 m/s   (0.42-0.7 m/s)  E/A Ratio:    1.86       (1.0-2.2)  MV e'         0.08 m/s   (>8.0)  MV lateral e' 0.10 m/s  MV medial e'  0.07 m/s  MV A Dur:     81.00 msec  E/e' Ratio:   8.88       (<8.0)  LV IVRT:      109 msec   (<100ms)    MITRAL VALVE:  Normal Ranges:  MV DT: 165 msec (150-240msec)    AORTIC VALVE:  Normal Ranges:  LVOT Max Andrea:  0.95 m/s (<1.1m/s)  LVOT VTI:      16.20 cm  LVOT Diameter: 2.30 cm  (1.8-2.4cm)    RIGHT VENTRICLE:  RV 1   3.7 cm  RV 2   3.1 cm  RV 3   7.7 cm  TAPSE: 20.0 mm  RV s'  0.14 m/s    TRICUSPID VALVE/RVSP:  Normal Ranges:  Peak TR Velocity: 2.20 m/s  Est. RA Pressure: 3 mmHg  RV Syst Pressure: 22.4 mmHg (< 30mmHg)  TV E Vmax:        0.55 m/s  (0.3-0.7m/s)  IVC Diam:         0.90 cm    PULMONIC VALVE:  Normal Ranges:  PIEDV: 1.17 m/s  PADP:  8.5 mmHg      [id number] Anthony Herman MD  Electronically signed on 10/7/2022 at 6:49:31 PM         Final     Stress Testing IMESULT([id number]:1:1825): No results found for this or any previous visit from the past 1825 days.    Cardiac Catheterization:   Adult Cath     [facility], Cath Lab  [address] [place], [place] [id number]  Phone [phone] Fax [phone]    Cardiovascular Catheterization Report    Patient Name:     JAVIER RIOS Performing Physician: [id number] Manning MD  Study Date:       6/9/2022    Verifying Physician:  Anirudh Manning MD  MRN/PID:          [id number]    Cardiologist:  Accession/Order#: [id number]   Referring Physician:  [id number] BENIGNO CROSS  YOB: 1980   Referring Physician:  Gender:           M           Referring Physician:      Study: Left and Right Heart Catheterization      Indications:  JAVIER RIOS is a 42 year old male who presents with chronic pulmonary disease. Cardiomyopathy and left ventricular dysfunction, with an asymptomatic chest pain assessment. Study performed as an urgent cath procedure. Heart  failure.    Medical History:  Stress test performed: No. CTA performed: No. [facility] accessed: No. LVEF Assessed: Yes. LVEF = 25-30%%.    Procedure Description:  After infiltration with 2% Lidocaine, the right femoral artery was cannulated with a modified Seldinger technique. Subsequently a 6 Micronesian sheath was placed in the right femoral artery. After infiltration of local anesthetic, the right femoral vein was cannulated with a micropuncture technique. A 7 Micronesian sheath was placed in the vein. Selective coronary catheterization was performed using a 6 Fr catheter(s) exchanged over a guide wire to cannulate the coronary arteries. A JL 4 tip catheter was used for left coronary injections. A JR 4 tip catheter was used for right coronary injections.  Multiple injections of contrast were made into the left and right coronary arteries with angiograms recorded in multiple projections. A balloon tipped catheter was advanced through the right heart to record pressures. Cardiac output was calculated via the Kailey method. After completion of the procedure, femoral artery angiography was performed. This demonstrated a common femoral artery puncture appropriate for closure. An Angio-Seal Evolution 6F ([employer]) vascular closure device was placed per protocol. Post-procedure, the venous sheath was pulled and pressure was applied to the site.    Coronary Angiography:  The coronary circulation is right dominant.    Left Main Coronary Artery:  The left main coronary artery is a large caliber vessel. The left main arises normally from the left coronary sinus of Valsalva, bifurcates into the LAD and circumflex coronary arteries and gives rise to the ramus intermedius artery. The left main coronary artery showed mild luminal irregularities.    Left Anterior Descending Coronary Artery Distribution:  The left anterior descending coronary artery is a large caliber vessel. The LAD arises normally from the left main coronary artery  and wraps around the apex of the LV. The LAD demonstrated mild distal atherosclerotic disease. The 1st diagonal branch is a normal caliber vessel. The 1st diagonal branch showed no significant disease or stenosis greater than 30%. The 2nd diagonal branch is a small caliber vessel. The 2nd diagonal branch demonstrated no significant disease or stenosis greater than 30%. The 3rd diagonal branch is a normal caliber vessel. The 3rd diagonal branch revealed no significant disease or stenosis greater than 30%.    Circumflex Coronary Artery Distribution:  The circumflex coronary artery is a large caliber vessel. The circumflex arises normally from the left main coronary artery, giving rise to the first obtuse marginal, second obtuse marginal and third obtuse marginal. The circumflex revealed mild luminal irregularities. The 1st obtuse marginal branch is a normal caliber vessel. The ostial 1st obtuse marginal branch showed 30% stenosis. This lesion was focal. The 2nd obtuse marginal branch is a medium-sized caliber vessel. The 2nd obtuse marginal branch demonstrated mild atherosclerotic disease.    Ramus Intermedius:  The ramus intermedius is a large caliber vessel. The ramus intermedius arises normally from the left main coronary artery. The ramus intermedius showed no significant disease or stenosis greater than 30%.    Right Heart Catheterization:  A balloon tipped catheter was advanced through the right heart to record pressures. Cardiac output was calculated via the Kailey method. Elevated left sided filling pressures with low cardiac output. Cardiac output is moderately decreased. No evidence of shunt. Cardiogenic shock. Pulmonary venous hypertension. Elevated right and left sided filling pressures, depressed Kailey CO/CI.  RA mean 14, PA 55/32/42, PCWP 35, LVEDP 37.    Right Coronary Artery Distribution:    The right coronary artery is a large caliber vessel. The RCA arises normally from the right sinus of Valsalva,  supplies the right posterolateral descending artery and supplies the posterolateral system. The RCA showed diffuse mild atherosclerotic disease.    Coronary Lesion Summary:  Vessel   Stenosis   Vessel Segment  OM 1   30% stenosis     ostial      Complications:  No in-lab complications observed.    Cardiac Cath Transition of Care Summary:  Post Procedure           Mild CAD.  Diagnosis:  Blood Loss:              Estimated blood loss during the procedure was minimal  mls.  Specimens Removed:       Number of specimen(s) removed: for sats.      Recommendations:  Maximize medical therapy.  Agressive risk factor modification efforts.  Telemetry monitoring.  Follow-up with cardiology clinic.  Monitor vitals and arterial access site/pulses.  Lipid lowering agent or Statin therapy.  Discussed [facility] findings with referring physician Dr. Kumari, plan for diuresis,  optimal medical therapy for heart failure, and short course of inotropic  therapy.  Medical management of coronary artery disease.  Aspirin therapy.    ____________________________________________________________________________________  CONCLUSIONS:  1. Mild nonobstructive CAD.  2. Elevated right and left sided filling pressures, depressed [place] CO/CI.  RA mean 14, PA 55/32/42, PCWP 35, LVEDP 37.    ____________________________________________________________________________________  CPT Codes:  Right & Left Heart Cath w/ventriculography/Coronary angio ([facility])([facility])-78563; Moderate Sedation Services initial 15 minutes patient >5 years-05772    ICD 10 Codes:  I42.0-Dilated cardiomyopathy; I50.21-Acute systolic (congestive) heart failure    [id number] Donavon Manning MD  Performing Physician  Electronically signed by [id number] Donavon Manning MD on 6/15/2022 at 11:23:00 AM      cc Report to: [id number] [address]           Final   No results found for this or any previous visit from the past 3650 days.     Cardiac Scoring: No results found for this or any previous visit from the past 1825  days.    AAA : No results found for this or any previous visit from the past 1825 days.    OTHER: No results found for this or any previous visit from the past 1825 days.    LAST IMAGING RESULTS  ECG 12 Lead  Sinus rhythm, nonspecific T wave abnormality, heart rate 91 bpm.      Problem List Items Addressed This Visit       Hypertension    Hyperlipemia    Chronic combined systolic and diastolic heart failure (Multi) - Primary    Coronary artery disease involving native coronary artery of native heart    Developmental disability    LEIDA on CPAP    Obesity (BMI 30-39.9)         Rajesh Kumari DO, FACC, FACOI

## 2024-07-24 ENCOUNTER — APPOINTMENT (OUTPATIENT)
Dept: CARDIOLOGY | Facility: CLINIC | Age: 44
End: 2024-07-24
Payer: MEDICARE

## 2024-07-24 VITALS
SYSTOLIC BLOOD PRESSURE: 130 MMHG | HEART RATE: 91 BPM | DIASTOLIC BLOOD PRESSURE: 86 MMHG | BODY MASS INDEX: 33.75 KG/M2 | WEIGHT: 210 LBS | HEIGHT: 66 IN

## 2024-07-24 DIAGNOSIS — G47.33 OSA ON CPAP: ICD-10-CM

## 2024-07-24 DIAGNOSIS — I10 PRIMARY HYPERTENSION: ICD-10-CM

## 2024-07-24 DIAGNOSIS — G47.33 OSA (OBSTRUCTIVE SLEEP APNEA): ICD-10-CM

## 2024-07-24 DIAGNOSIS — I50.42 CHRONIC COMBINED SYSTOLIC AND DIASTOLIC HEART FAILURE (MULTI): Primary | ICD-10-CM

## 2024-07-24 DIAGNOSIS — E66.9 OBESITY (BMI 30-39.9): ICD-10-CM

## 2024-07-24 DIAGNOSIS — F89 DEVELOPMENTAL DISABILITY: ICD-10-CM

## 2024-07-24 DIAGNOSIS — I25.10 CORONARY ARTERY DISEASE INVOLVING NATIVE CORONARY ARTERY OF NATIVE HEART WITHOUT ANGINA PECTORIS: ICD-10-CM

## 2024-07-24 DIAGNOSIS — E78.2 MIXED HYPERLIPIDEMIA: ICD-10-CM

## 2024-07-24 DIAGNOSIS — I25.10 CORONARY ARTERY DISEASE INVOLVING NATIVE CORONARY ARTERY OF NATIVE HEART, UNSPECIFIED WHETHER ANGINA PRESENT: ICD-10-CM

## 2024-07-24 PROCEDURE — 3079F DIAST BP 80-89 MM HG: CPT | Performed by: INTERNAL MEDICINE

## 2024-07-24 PROCEDURE — 93000 ELECTROCARDIOGRAM COMPLETE: CPT | Performed by: INTERNAL MEDICINE

## 2024-07-24 PROCEDURE — 99214 OFFICE O/P EST MOD 30 MIN: CPT | Performed by: INTERNAL MEDICINE

## 2024-07-24 PROCEDURE — 3008F BODY MASS INDEX DOCD: CPT | Performed by: INTERNAL MEDICINE

## 2024-07-24 PROCEDURE — 1036F TOBACCO NON-USER: CPT | Performed by: INTERNAL MEDICINE

## 2024-07-24 PROCEDURE — 3075F SYST BP GE 130 - 139MM HG: CPT | Performed by: INTERNAL MEDICINE

## 2024-08-12 DIAGNOSIS — I50.42 CHRONIC COMBINED SYSTOLIC AND DIASTOLIC HEART FAILURE (MULTI): Primary | ICD-10-CM

## 2024-08-12 RX ORDER — SACUBITRIL AND VALSARTAN 24; 26 MG/1; MG/1
1 TABLET, FILM COATED ORAL 2 TIMES DAILY
Qty: 180 TABLET | Refills: 2 | Status: CANCELLED | OUTPATIENT
Start: 2024-08-12 | End: 2025-08-12

## 2024-08-13 PROCEDURE — RXMED WILLOW AMBULATORY MEDICATION CHARGE

## 2024-08-13 RX ORDER — SACUBITRIL AND VALSARTAN 24; 26 MG/1; MG/1
1 TABLET, FILM COATED ORAL 2 TIMES DAILY
Qty: 180 TABLET | Refills: 3 | Status: SHIPPED | OUTPATIENT
Start: 2024-08-13 | End: 2025-08-13

## 2024-08-14 ENCOUNTER — PHARMACY VISIT (OUTPATIENT)
Dept: PHARMACY | Facility: CLINIC | Age: 44
End: 2024-08-14
Payer: COMMERCIAL

## 2024-08-19 PROCEDURE — RXMED WILLOW AMBULATORY MEDICATION CHARGE

## 2024-08-22 ENCOUNTER — PHARMACY VISIT (OUTPATIENT)
Dept: PHARMACY | Facility: CLINIC | Age: 44
End: 2024-08-22
Payer: COMMERCIAL

## 2024-09-04 PROCEDURE — RXMED WILLOW AMBULATORY MEDICATION CHARGE

## 2024-09-05 ENCOUNTER — PHARMACY VISIT (OUTPATIENT)
Dept: PHARMACY | Facility: CLINIC | Age: 44
End: 2024-09-05
Payer: COMMERCIAL

## 2024-09-05 DIAGNOSIS — I50.42 CHRONIC COMBINED SYSTOLIC AND DIASTOLIC HEART FAILURE (MULTI): ICD-10-CM

## 2024-09-05 RX ORDER — SPIRONOLACTONE 25 MG/1
12.5 TABLET ORAL DAILY
Qty: 45 TABLET | Refills: 1 | Status: SHIPPED | OUTPATIENT
Start: 2024-09-05 | End: 2025-03-04

## 2024-09-05 NOTE — TELEPHONE ENCOUNTER
----- Message from Nurse Radha SUAREZ sent at 9/4/2024  5:41 PM EDT -----  Regarding: Refill  Patient needs a refill of spironolactone. They are running out.

## 2024-09-16 ENCOUNTER — SPECIALTY PHARMACY (OUTPATIENT)
Dept: PHARMACY | Facility: CLINIC | Age: 44
End: 2024-09-16

## 2024-09-16 PROCEDURE — RXMED WILLOW AMBULATORY MEDICATION CHARGE

## 2024-09-17 ENCOUNTER — PHARMACY VISIT (OUTPATIENT)
Dept: PHARMACY | Facility: CLINIC | Age: 44
End: 2024-09-17
Payer: COMMERCIAL

## 2024-09-19 ENCOUNTER — SPECIALTY PHARMACY (OUTPATIENT)
Dept: PHARMACY | Facility: CLINIC | Age: 44
End: 2024-09-19

## 2024-10-07 ENCOUNTER — SPECIALTY PHARMACY (OUTPATIENT)
Dept: PHARMACY | Facility: CLINIC | Age: 44
End: 2024-10-07

## 2024-10-08 PROCEDURE — RXMED WILLOW AMBULATORY MEDICATION CHARGE

## 2024-10-16 ENCOUNTER — PHARMACY VISIT (OUTPATIENT)
Dept: PHARMACY | Facility: CLINIC | Age: 44
End: 2024-10-16
Payer: COMMERCIAL

## 2024-11-14 PROCEDURE — RXMED WILLOW AMBULATORY MEDICATION CHARGE

## 2024-11-15 ENCOUNTER — SPECIALTY PHARMACY (OUTPATIENT)
Dept: PHARMACY | Facility: CLINIC | Age: 44
End: 2024-11-15

## 2024-11-15 ENCOUNTER — PHARMACY VISIT (OUTPATIENT)
Dept: PHARMACY | Facility: CLINIC | Age: 44
End: 2024-11-15
Payer: COMMERCIAL

## 2024-11-15 DIAGNOSIS — E78.2 MIXED HYPERLIPIDEMIA: ICD-10-CM

## 2024-11-29 RX ORDER — ATORVASTATIN CALCIUM 20 MG/1
TABLET, FILM COATED ORAL
Qty: 90 TABLET | Refills: 1 | Status: SHIPPED | OUTPATIENT
Start: 2024-11-29

## 2024-12-05 DIAGNOSIS — K59.04 CHRONIC IDIOPATHIC CONSTIPATION: ICD-10-CM

## 2024-12-05 DIAGNOSIS — I50.42 CHRONIC COMBINED SYSTOLIC AND DIASTOLIC HEART FAILURE: ICD-10-CM

## 2024-12-05 RX ORDER — POLYETHYLENE GLYCOL 3350 17 G/17G
POWDER, FOR SOLUTION ORAL
Qty: 510 G | Refills: 5 | Status: SHIPPED | OUTPATIENT
Start: 2024-12-05 | End: 2024-12-05 | Stop reason: SDUPTHER

## 2024-12-05 RX ORDER — FUROSEMIDE 20 MG/1
20 TABLET ORAL DAILY
Qty: 90 TABLET | Refills: 1 | Status: CANCELLED | OUTPATIENT
Start: 2024-12-05 | End: 2025-12-05

## 2024-12-05 RX ORDER — POLYETHYLENE GLYCOL 3350 17 G/17G
POWDER, FOR SOLUTION ORAL
Qty: 510 G | Refills: 11 | Status: SHIPPED | OUTPATIENT
Start: 2024-12-05

## 2024-12-06 PROCEDURE — RXMED WILLOW AMBULATORY MEDICATION CHARGE

## 2024-12-06 RX ORDER — FUROSEMIDE 20 MG/1
20 TABLET ORAL DAILY
Qty: 90 TABLET | Refills: 1 | Status: SHIPPED | OUTPATIENT
Start: 2024-12-06 | End: 2025-12-06

## 2024-12-06 RX ORDER — SPIRONOLACTONE 25 MG/1
12.5 TABLET ORAL DAILY
Qty: 45 TABLET | Refills: 1 | Status: SHIPPED | OUTPATIENT
Start: 2024-12-06

## 2024-12-09 ENCOUNTER — PHARMACY VISIT (OUTPATIENT)
Dept: PHARMACY | Facility: CLINIC | Age: 44
End: 2024-12-09
Payer: COMMERCIAL

## 2025-01-02 PROCEDURE — RXMED WILLOW AMBULATORY MEDICATION CHARGE

## 2025-01-06 ENCOUNTER — PHARMACY VISIT (OUTPATIENT)
Dept: PHARMACY | Facility: CLINIC | Age: 45
End: 2025-01-06
Payer: COMMERCIAL

## 2025-01-15 ENCOUNTER — LAB (OUTPATIENT)
Dept: LAB | Facility: LAB | Age: 45
End: 2025-01-15
Payer: COMMERCIAL

## 2025-01-15 DIAGNOSIS — I10 PRIMARY HYPERTENSION: ICD-10-CM

## 2025-01-15 DIAGNOSIS — G47.33 OSA (OBSTRUCTIVE SLEEP APNEA): ICD-10-CM

## 2025-01-15 DIAGNOSIS — I50.42 CHRONIC COMBINED SYSTOLIC AND DIASTOLIC HEART FAILURE: ICD-10-CM

## 2025-01-15 DIAGNOSIS — G47.33 OSA ON CPAP: ICD-10-CM

## 2025-01-15 DIAGNOSIS — I25.10 CORONARY ARTERY DISEASE INVOLVING NATIVE CORONARY ARTERY OF NATIVE HEART WITHOUT ANGINA PECTORIS: ICD-10-CM

## 2025-01-15 DIAGNOSIS — E78.2 MIXED HYPERLIPIDEMIA: ICD-10-CM

## 2025-01-15 DIAGNOSIS — E66.9 OBESITY (BMI 30-39.9): ICD-10-CM

## 2025-01-15 DIAGNOSIS — I25.10 CORONARY ARTERY DISEASE INVOLVING NATIVE CORONARY ARTERY OF NATIVE HEART, UNSPECIFIED WHETHER ANGINA PRESENT: ICD-10-CM

## 2025-01-15 DIAGNOSIS — F89 DEVELOPMENTAL DISABILITY: ICD-10-CM

## 2025-01-15 LAB
ANION GAP SERPL CALC-SCNC: 12 MMOL/L (ref 10–20)
BNP SERPL-MCNC: 10 PG/ML (ref 0–99)
BUN SERPL-MCNC: 14 MG/DL (ref 6–23)
CALCIUM SERPL-MCNC: 9.8 MG/DL (ref 8.6–10.3)
CHLORIDE SERPL-SCNC: 99 MMOL/L (ref 98–107)
CO2 SERPL-SCNC: 31 MMOL/L (ref 21–32)
CREAT SERPL-MCNC: 1.07 MG/DL (ref 0.5–1.3)
EGFRCR SERPLBLD CKD-EPI 2021: 88 ML/MIN/1.73M*2
GLUCOSE SERPL-MCNC: 87 MG/DL (ref 74–99)
MAGNESIUM SERPL-MCNC: 1.95 MG/DL (ref 1.6–2.4)
POTASSIUM SERPL-SCNC: 4 MMOL/L (ref 3.5–5.3)
SODIUM SERPL-SCNC: 138 MMOL/L (ref 136–145)

## 2025-01-15 PROCEDURE — 83880 ASSAY OF NATRIURETIC PEPTIDE: CPT

## 2025-01-15 PROCEDURE — 80048 BASIC METABOLIC PNL TOTAL CA: CPT

## 2025-01-15 PROCEDURE — 83735 ASSAY OF MAGNESIUM: CPT

## 2025-01-24 ENCOUNTER — HOSPITAL ENCOUNTER (OUTPATIENT)
Dept: CARDIOLOGY | Facility: HOSPITAL | Age: 45
Discharge: HOME | End: 2025-01-24
Payer: MEDICARE

## 2025-01-24 DIAGNOSIS — G47.33 OSA (OBSTRUCTIVE SLEEP APNEA): ICD-10-CM

## 2025-01-24 DIAGNOSIS — I25.10 CORONARY ARTERY DISEASE INVOLVING NATIVE CORONARY ARTERY OF NATIVE HEART WITHOUT ANGINA PECTORIS: ICD-10-CM

## 2025-01-24 DIAGNOSIS — E66.9 OBESITY (BMI 30-39.9): ICD-10-CM

## 2025-01-24 DIAGNOSIS — I50.42 CHRONIC COMBINED SYSTOLIC AND DIASTOLIC HEART FAILURE: ICD-10-CM

## 2025-01-24 DIAGNOSIS — G47.33 OSA ON CPAP: ICD-10-CM

## 2025-01-24 DIAGNOSIS — I10 PRIMARY HYPERTENSION: ICD-10-CM

## 2025-01-24 DIAGNOSIS — I25.10 CORONARY ARTERY DISEASE INVOLVING NATIVE CORONARY ARTERY OF NATIVE HEART, UNSPECIFIED WHETHER ANGINA PRESENT: ICD-10-CM

## 2025-01-24 DIAGNOSIS — F89 DEVELOPMENTAL DISABILITY: ICD-10-CM

## 2025-01-24 DIAGNOSIS — E78.2 MIXED HYPERLIPIDEMIA: ICD-10-CM

## 2025-01-24 PROBLEM — I50.22 CHRONIC HEART FAILURE WITH MILDLY REDUCED EJECTION FRACTION (HFMREF, 41-49%): Status: ACTIVE | Noted: 2025-01-24

## 2025-01-24 PROCEDURE — C8929 TTE W OR WO FOL WCON,DOPPLER: HCPCS

## 2025-01-24 PROCEDURE — 2500000004 HC RX 250 GENERAL PHARMACY W/ HCPCS (ALT 636 FOR OP/ED): Performed by: INTERNAL MEDICINE

## 2025-01-24 RX ADMIN — HUMAN ALBUMIN MICROSPHERES AND PERFLUTREN 0.5 ML: 10; .22 INJECTION, SOLUTION INTRAVENOUS at 14:33

## 2025-01-24 NOTE — PROGRESS NOTES
[facility] Heart and Vascular Cardiology    Patient Name: Zhang Alvarado  Patient : 1980      Scribe Attestation  By signing my name below, I, Marva Granda   attest that this documentation has been prepared under the direction and in the presence of Rajesh Kumari DO.      Reason for visit:  This is a 44-year-old male here for follow-up regarding chronic HFmrEF with an ejection fraction of 45%, minimal coronary artery disease as seen on cardiac catheterization done in 2022, hypertension, dyslipidemia, developmental disability, obstructive sleep apnea, and obesity.     HPI:  This is a 44-year-old male here for follow-up regarding chronic HFmrEF with an ejection fraction of 45%, minimal coronary artery disease as seen on cardiac catheterization done in 2022, hypertension, dyslipidemia, developmental disability, obstructive sleep apnea, and obesity.  The patient was last evaluated by me in 2024.  At that visit I ordered an echocardiogram to be completed in 6 months, ordered blood work including BMP/BNP/magnesium to be drawn in 6 months, and asked the patient to follow-up in 6 months and sooner if necessary. BMP done 1/15/2025 showed normal serum sodium and potassium with a serum creatinine of 1.07, serum magnesium was 1.95, BNP was 10. Echocardiogram done in 2025 showed mildly reduced left ventricular systolic function with an ejection fraction of 45%, abnormal diastolic function, normal right ventricular systolic function, no significant valve abnormalities. ECG done today showed sinus rhythm with a heart rate of 91 bpm.  The patient reports that he has been feeling generally well from the cardiac standpoint. His mother states that the patient has episodic mild shortness of breath but does not seem bothersome. He denies any new chest pain, significant shortness of breath on exertion, palpitations and lightheadedness. He states that he takes all of his medications as  prescribed. During my exam, he was resting comfortably on the exam table.            Assessment/Plan:   1. Chronic HFmrEF  The patient has chronic HFmrEF with an ejection fraction of 45%.  Echocardiogram done in January 2025 showed mildly reduced left ventricular systolic function with an ejection fraction of 45%, abnormal diastolic function, normal right ventricular systolic function, no significant valve abnormalities.   He does not appear significantly volume overloaded on exam today.   He should continue current heart failure medications.  Recent lab works as noted in the HPI.   Lab works as noted below will be done in 6 months prior to his next visit.  I discussed with him the importance of following a low-sodium heart healthy diet as well as weight loss.  Follow up in 6 months and sooner if necessary.      2. Coronary artery disease  The patient has a history of minimal coronary artery disease as seen on cardiac catheterization done in June 2022.  ECG done today showed sinus rhythm with a heart rate of 91 bpm.    He denies anginal chest discomfort.  Blood pressure appears controlled on exam today.  He should continue current antihypertensive medications and antiplatelet therapy.  Echocardiogram done in January 2025 showed mildly reduced left ventricular systolic function with an ejection fraction of 45%, abnormal diastolic function, normal right ventricular systolic function, no significant valve abnormalities.   Recent lab works as noted in the HPI.  Lipid panel done in July 2024 showed an LDL cholesterol of 44 and triglycerides of 202 currently on atorvastatin 20 mg daily.   Lab works as noted below will be done in 6 months prior to his next visit.   Please see lifestyle recommendations below.   Follow up in 6 months and sooner if necessary.      3. Hypertension  The patient has a history of hypertension which appears controlled on exam today.  He should continue his current antihypertensive medications and  monitor his blood pressure at home.     4. Dyslipidemia  Lipid panel done in July 2024 showed an LDL cholesterol of 44 and triglycerides of 202 currently on atorvastatin 20 mg daily.   Lipid panel will be updated in 6 months.  Please see lifestyle recommendations below.      5. Developmental disability  Management as per PCP.      6. Obstructive sleep apnea  The patient has a history of obstructive sleep apnea.  He should continue to follow up with Sleep Medicine.     7. Obesity  Please see lifestyle recommendations below.             Orders:   CMP/lipid/magnesium/CBC/BNP in 6 months,   Follow-up in 6 months.    Lifestyle Recommendations  I recommend a whole-food plant-based diet, an eating pattern that encourages the consumption of unrefined plant foods (such as fruits, vegetables, tubers, whole grains, legumes, nuts and seeds) and discourages meats, dairy products, eggs and processed foods.     The AHA/ACC recommends that the patient consume a dietary pattern that emphasizes intake of vegetables, fruits, and whole grains; includes low-fat dairy products, poultry, fish, legumes, non-tropical vegetable oils, and nuts; and limits intake of sodium, sweets, sugar-sweetened beverages, and red meats.  Adapt this dietary pattern to appropriate calorie requirements (a 500-750 kcal/day deficit to loose weight), personal and cultural food preferences, and nutrition therapy for other medical conditions (including diabetes).  Achieve this pattern by following plans such as the [place] Mediterranean, DASH dietary pattern, or AHA diet.     Engage in 2 hours and 30 minutes per week of moderate-intensity physical activity, or 1 hour and 15 minutes (75 minutes) per week of vigorous-intensity aerobic physical activity, or an equivalent combination of moderate and vigorous-intensity aerobic physical activity. Aerobic activity should be performed in episodes of at least 10 minutes preferably spread throughout the week.     Adhering to a  heart healthy diet, regular exercise habits, avoidance of tobacco products, and maintenance of a healthy weight are crucial components of their heart disease risk reduction.     Any positive review of systems not specifically addressed in the office visit today should be evaluated and treated by the patients primary care physician or in an emergency department if necessary     Patient was notified that results from ordered tests will be called to the patient if it changes current management; it will otherwise be discussed at a future appointment and available on [facility].     Thank you for allowing me to participate in the care of this patient.        This document was generated using the assistance of voice recognition software. If there are any errors of spelling, grammar, syntax, or meaning; please feel free to contact me directly for clarification.    Past Medical History:  He has a past medical history of Disturbances of salivary secretion, Other conditions influencing health status, Personal history of diseases of the skin and subcutaneous tissue, Personal history of other diseases of the circulatory system, and Raynaud's syndrome without gangrene.    Past Surgical History:  He has a past surgical history that includes Other surgical history (04/05/2019).      Social History:  He reports that he has never smoked. He has never used smokeless tobacco. He reports that he does not drink alcohol and does not use drugs.    Family History:  No family history on file.     Allergies:  Augmentin [amoxicillin-pot clavulanate], Cephalexin, and Penicillins    Outpatient Medications:  Current Outpatient Medications   Medication Instructions    aspirin 81 mg EC tablet oral    atorvastatin (Lipitor) 20 mg tablet Take 1 tablet (20 mg) by mouth daily at bedtime    cholecalciferol (Vitamin D-3) 25 MCG (1000 UT) capsule 1 capsule, oral, Daily    desipramine (NORPRAMIN) 50 mg, oral, Nightly    furosemide (Lasix) 20 mg tablet  "TAKE 1 TABLET BY MOUTH ONCE DAILY    metoprolol succinate XL (Toprol-XL) 25 mg 24 hr tablet TAKE 1 TABLET BY MOUTH TWO TIMES A DAY    multivitamin (Daily Multi-Vitamin) tablet oral, Daily RT    polyethylene glycol (Glycolax, Miralax) 17 gram/dose powder 1 capful daily    risperiDONE (RISPERDAL) 0.5 mg, oral, 2 times daily    sacubitriL-valsartan (Entresto) 24-26 mg tablet TAKE 1 TABLET BY MOUTH TWICE A DAY    sacubitriL-valsartan (Entresto) 24-26 mg tablet TAKE 1 TABLET BY MOUTH TWICE A DAY    sennosides (SENNA) 8.6 mg, oral, Daily    spironolactone (ALDACTONE) 12.5 mg, oral, Daily        ROS:  A 14 point review of systems was done and is negative other than as stated in HPI    Vitals:      2/21/2023     3:42 PM 5/24/2023     3:03 PM 7/20/2023     2:19 PM 1/24/2024     4:03 PM 2/15/2024     3:56 PM 6/10/2024     8:08 AM 7/24/2024     3:53 PM   Vitals   Systolic 125 118 120 126 115 116 130   Diastolic 80 70 78 90 71 78 86   Heart Rate 83 88 84 91 88 84 91   Temp 36.3 °C (97.3 °F) 36 °C (96.8 °F)   36.9 °C (98.4 °F) 36.7 °C (98 °F)    Resp 16    16     Height 1.626 m (5' 4\")  1.626 m (5' 4\") 1.676 m (5' 6\") 1.676 m (5' 6\") 1.676 m (5' 6\") 1.676 m (5' 6\")   Weight (lb) 207.7  203.38 209.6 206 214 210   BMI 35.65 kg/m2  34.91 kg/m2 33.83 kg/m2 33.25 kg/m2 34.54 kg/m2 33.89 kg/m2   BSA (m2) 2.06 m2  2.04 m2 2.1 m2 2.09 m2 2.13 m2 2.11 m2        Physical Exam:   Constitutional: Cooperative, in no acute distress, alert, appears stated age.   Skin: Skin color, texture, turgor normal. No rashes or lesions.   Head: Normocephalic. No masses, lesions, tenderness or abnormalities   Eyes: Extraocular movements are grossly intact.   Mouth and throat: Mucous membranes moist   Neck: Neck supple, no carotid bruits, no JVD   Respiratory: Lungs clear to auscultation, no wheezing or rhonchi, no use of accessory muscles   Chest wall: No scars, normal excursion with respiration   Cardiovascular: Regular rhythm without murmur, gallop, or " rubs   Gastrointestinal: Abdomen soft, nontender. Bowel sounds normal. Obese.  Musculoskeletal: Strength equal in upper extremities   Extremities: No pitting edema   Neurologic: Sensation grossly intact, alert and oriented x3      Intake/Output:   No intake/output data recorded.    Outpatient Medications  Current Outpatient Medications on File Prior to Visit   Medication Sig Dispense Refill    aspirin 81 mg EC tablet Take by mouth.      atorvastatin (Lipitor) 20 mg tablet Take 1 tablet (20 mg) by mouth daily at bedtime 90 tablet 1    cholecalciferol (Vitamin D-3) 25 MCG (1000 UT) capsule Take 1 capsule (25 mcg) by mouth once daily.      desipramine (Norpramin) 50 mg tablet Take 1 tablet (50 mg) by mouth once daily at bedtime.      furosemide (Lasix) 20 mg tablet TAKE 1 TABLET BY MOUTH ONCE DAILY 90 tablet 1    metoprolol succinate XL (Toprol-XL) 25 mg 24 hr tablet TAKE 1 TABLET BY MOUTH TWO TIMES A  tablet 3    multivitamin (Daily Multi-Vitamin) tablet Take by mouth once daily.      polyethylene glycol (Glycolax, Miralax) 17 gram/dose powder Mix of powder and drink. 1 capful daily 510 g 11    risperiDONE (RisperDAL) 0.5 mg tablet Take 1 tablet (0.5 mg) by mouth 2 times a day.      sacubitriL-valsartan (Entresto) 24-26 mg tablet TAKE 1 TABLET BY MOUTH TWICE A  tablet 1    sacubitriL-valsartan (Entresto) 24-26 mg tablet TAKE 1 TABLET BY MOUTH TWICE A  tablet 3    sennosides (senna) 8.6 mg tablet Take 1 tablet (8.6 mg) by mouth once daily. 90 tablet 2    spironolactone (Aldactone) 25 mg tablet TAKE 0.5 TABLETS BY MOUTH ONCE DAILY. 45 tablet 1     No current facility-administered medications on file prior to visit.       Labs: (past 26 weeks)  Recent Results (from the past 26 weeks)   Basic Metabolic Panel    Collection Time: 01/15/25  1:11 PM   Result Value Ref Range    Glucose 87 74 - 99 mg/dL    Sodium 138 136 - 145 mmol/L    Potassium 4.0 3.5 - 5.3 mmol/L    Chloride 99 98 - 107 mmol/L     Bicarbonate 31 21 - 32 mmol/L    Anion Gap 12 10 - 20 mmol/L    Urea Nitrogen 14 6 - 23 mg/dL    Creatinine 1.07 0.50 - 1.30 mg/dL    eGFR 88 >60 mL/min/1.73m*2    Calcium 9.8 8.6 - 10.3 mg/dL   Magnesium    Collection Time: 01/15/25  1:11 PM   Result Value Ref Range    Magnesium 1.95 1.60 - 2.40 mg/dL   B-Type Natriuretic Peptide    Collection Time: 01/15/25  1:11 PM   Result Value Ref Range    BNP 10 0 - 99 pg/mL       ECG  Encounter Date: 07/24/24   ECG 12 Lead    Narrative    Sinus rhythm, nonspecific ST and T wave abnormality, heart rate 91 bpm.       Echocardiogram  No results found for this or any previous visit from the past 1095 days.      CV Studies:  EKG:  Encounter Date: 07/24/24   ECG 12 Lead    Narrative    Sinus rhythm, nonspecific ST and T wave abnormality, heart rate 91 bpm.     Echocardiogram:   Echocardiogram     [place] [address]  Phone [phone] Fax [phone]    TRANSTHORACIC ECHOCARDIOGRAM REPORT      Patient Name:     JAVIER GABRIEL  Reading Physician:   [id number] Anthony Herman MD  Study Date:       10/7/2022    Referring Physician: [id number] CROSS  MRN/PID:          [id number]     PCP:  Accession/Order#: [id number] Department Location: [facility] Echo Lab  YOB: 1980    Fellow:  Gender:           M            Nurse:  Admit Date:                    Sonographer:         Brigid Fischer VEE  Admission Status: Outpatient   Additional Staff:  Height:           162.56 cm    CC Report to:  Weight:           92.53 kg     Study Type:          Echocardiogram  BSA:              1.97 m2  Blood Pressure: 118 /74 mmHg    Diagnosis/ICD: I50.40-Unspecified combined systolic (congestive) and diastolic  (congestive) heart failure (CHF); I25.10-Atherosclerotic heart  disease of native coronary artery without angina pectoris  Indication:    CAD, CHF  Procedure/CPT: Echo Complete w Full Doppler-21056    Patient History:  Pertinent  History: CAD, CHF and Dyslipidemia.    Study Detail: The following Echo studies were performed: 2D, M-Mode, Doppler and  color flow. Technically challenging study due to body habitus.      PHYSICIAN INTERPRETATION:  Left Ventricle: Left ventricular systolic function is moderately decreased, with an estimated ejection fraction of 35-40%. There is global hypokinesis of the left ventricle with minor regional variations. The left ventricular cavity size is normal. There is moderate concentric left ventricular hypertrophy. Spectral Doppler shows a restrictive pattern of left ventricular diastolic filling.  Left Atrium: The left atrium is normal in size.  Right Ventricle: The right ventricle is upper limits of normal in size. There is mildly reduced right ventricular systolic function.  Right Atrium: The right atrium is normal in size.  Aortic Valve: The aortic valve appears structurally normal. There is no evidence of aortic valve regurgitation.  Mitral Valve: The mitral valve is normal in structure. There is no evidence of mitral valve regurgitation.  Tricuspid Valve: The tricuspid valve is structurally normal. There is mild tricuspid regurgitation.  Pulmonic Valve: The pulmonic valve is structurally normal. There is no indication of pulmonic valve regurgitation.  Pericardium: There is no pericardial effusion noted.  Aorta: The aortic root is normal.      CONCLUSIONS:  1. Left ventricular systolic function is moderately decreased with a 35-40% estimated ejection fraction.  2. Spectral Doppler shows a restrictive pattern of left ventricular diastolic filling.  3. There is moderate concentric left ventricular hypertrophy.  4. There is mildly reduced right ventricular systolic function.  5. There is global hypokinesis of the left ventricle with minor regional variations.    QUANTITATIVE DATA SUMMARY:  2D MEASUREMENTS:  Normal Ranges:  Ao Root d:     3.30 cm    (2.0-3.7cm)  IVSd:          1.40 cm    (0.6-1.1cm)  LVPWd:          1.40 cm    (0.6-1.1cm)  LVIDd:         6.30 cm    (3.9-5.9cm)  LVIDs:         5.70 cm  LV Mass Index: 212.7 g/m2  LV % FS        9.5 %    LA VOLUME:  Normal Ranges:  LA Vol A4C:        41.6 ml    (22+/-6mL/m2)  LA Vol A2C:        41.6 ml  LA Vol BP:         41.6 ml  LA Vol Index A4C:  21.1ml/m2  LA Vol Index A2C:  21.1 ml/m2  LA Vol Index BP:   21.1 ml/m2  LA Area A4C:       14.0 cm2  LA Area A2C:       14.0 cm2  LA Major Axis A4C: 4.0 cm  LA Major Axis A2C: 4.0 cm  LA Volume Index:   21.0 ml/m2    RA VOLUME BY A/L METHOD:  Normal Ranges:  RA Area A4C: 10.0 cm2    M-MODE MEASUREMENTS:  Normal Ranges:  Ao Root: 3.20 cm (2.0-3.7cm)  AoV Exc: 2.40 cm (1.5-2.5cm)  LAs:     3.30 cm (2.7-4.0cm)    AORTA MEASUREMENTS:  Normal Ranges:  AoV Exc:   2.40 cm (1.5-2.5cm)  Asc Ao, d: 2.50 cm (2.1-3.4cm)    LV SYSTOLIC FUNCTION BY 2D PLANIMETRY (MOD):  Normal Ranges:  EF-A4C View: 40.9 % (>55%)  EF-A2C View: 33.8 %  EF-Biplane:  37.9 %    LV DIASTOLIC FUNCTION:  Normal Ranges:  MV Peak E:    0.76 m/s   (0.7-1.2 m/s)  MV Peak A:    0.40 m/s   (0.42-0.7 m/s)  E/A Ratio:    1.86       (1.0-2.2)  MV e'         0.08 m/s   (>8.0)  MV lateral e' 0.10 m/s  MV medial e'  0.07 m/s  MV A Dur:     81.00 msec  E/e' Ratio:   8.88       (<8.0)  LV IVRT:      109 msec   (<100ms)    MITRAL VALVE:  Normal Ranges:  MV DT: 165 msec (150-240msec)    AORTIC VALVE:  Normal Ranges:  LVOT Max Andrea:  0.95 m/s (<1.1m/s)  LVOT VTI:      16.20 cm  LVOT Diameter: 2.30 cm  (1.8-2.4cm)    RIGHT VENTRICLE:  RV 1   3.7 cm  RV 2   3.1 cm  RV 3   7.7 cm  TAPSE: 20.0 mm  RV s'  0.14 m/s    TRICUSPID VALVE/RVSP:  Normal Ranges:  Peak TR Velocity: 2.20 m/s  Est. RA Pressure: 3 mmHg  RV Syst Pressure: 22.4 mmHg (< 30mmHg)  TV E Vmax:        0.55 m/s  (0.3-0.7m/s)  IVC Diam:         0.90 cm    PULMONIC VALVE:  Normal Ranges:  PIEDV: 1.17 m/s  PADP:  8.5 mmHg      [id number] Anthony Herman MD  Electronically signed on 10/7/2022 at 6:49:31 PM         Final     Stress Testing  [facility][id number] No results found for this or any previous visit from the past 1825 days.    Cardiac Catheterization:   Adult Cath     [facility], Cath Lab  [address] [place], [place] [id number]  Phone [phone] Fax [phone]    Cardiovascular Catheterization Report    Patient Name:     JAVIER RIOS Performing Physician: [id number] Donavon Manning MD  Study Date:       6/9/2022    Verifying Physician:  [id number] Donavon Manning MD  MRN/PID:          [id number]    Cardiologist:  Accession/Order#: [id number]   Referring Physician:  [id number] BENIGNO CROSS  YOB: 1980   Referring Physician:  Gender:           M           Referring Physician:      Study: Left and Right Heart Catheterization      Indications:  JAVIER RIOS is a 42 year old male who presents with chronic pulmonary disease. Cardiomyopathy and left ventricular dysfunction, with an asymptomatic chest pain assessment. Study performed as an urgent cath procedure. Heart failure.    Medical History:  Stress test performed: No. CTA performed: No. Samuel accessed: No. LVEF Assessed: Yes. LVEF = 25-30%%.    Procedure Description:  After infiltration with 2% Lidocaine, the right femoral artery was cannulated with a modified Seldinger technique. Subsequently a 6 Portuguese sheath was placed in the right femoral artery. After infiltration of local anesthetic, the right femoral vein was cannulated with a micropuncture technique. A 7 Portuguese sheath was placed in the vein. Selective coronary catheterization was performed using a 6 Fr catheter(s) exchanged over a guide wire to cannulate the coronary arteries. A JL 4 tip catheter was used for left coronary injections. A JR 4 tip catheter was used for right coronary injections.  Multiple injections of contrast were made into the left and right coronary arteries with angiograms recorded in multiple projections. A balloon tipped catheter was advanced through the right heart to record  pressures. Cardiac output was calculated via the Kailey method. After completion of the procedure, femoral artery angiography was performed. This demonstrated a common femoral artery puncture appropriate for closure. An Angio-Seal Evolution 6F ([employer]) vascular closure device was placed per protocol. Post-procedure, the venous sheath was pulled and pressure was applied to the site.    Coronary Angiography:  The coronary circulation is right dominant.    Left Main Coronary Artery:  The left main coronary artery is a large caliber vessel. The left main arises normally from the left coronary sinus of Valsalva, bifurcates into the LAD and circumflex coronary arteries and gives rise to the ramus intermedius artery. The left main coronary artery showed mild luminal irregularities.    Left Anterior Descending Coronary Artery Distribution:  The left anterior descending coronary artery is a large caliber vessel. The LAD arises normally from the left main coronary artery and wraps around the apex of the LV. The LAD demonstrated mild distal atherosclerotic disease. The 1st diagonal branch is a normal caliber vessel. The 1st diagonal branch showed no significant disease or stenosis greater than 30%. The 2nd diagonal branch is a small caliber vessel. The 2nd diagonal branch demonstrated no significant disease or stenosis greater than 30%. The 3rd diagonal branch is a normal caliber vessel. The 3rd diagonal branch revealed no significant disease or stenosis greater than 30%.    Circumflex Coronary Artery Distribution:  The circumflex coronary artery is a large caliber vessel. The circumflex arises normally from the left main coronary artery, giving rise to the first obtuse marginal, second obtuse marginal and third obtuse marginal. The circumflex revealed mild luminal irregularities. The 1st obtuse marginal branch is a normal caliber vessel. The ostial 1st obtuse marginal branch showed 30% stenosis. This lesion was focal.  The 2nd obtuse marginal branch is a medium-sized caliber vessel. The 2nd obtuse marginal branch demonstrated mild atherosclerotic disease.    Ramus Intermedius:  The ramus intermedius is a large caliber vessel. The ramus intermedius arises normally from the left main coronary artery. The ramus intermedius showed no significant disease or stenosis greater than 30%.    Right Heart Catheterization:  A balloon tipped catheter was advanced through the right heart to record pressures. Cardiac output was calculated via the Kailey method. Elevated left sided filling pressures with low cardiac output. Cardiac output is moderately decreased. No evidence of shunt. Cardiogenic shock. Pulmonary venous hypertension. Elevated right and left sided filling pressures, depressed Kailey CO/CI.  RA mean 14, PA 55/32/42, PCWP 35, LVEDP 37.    Right Coronary Artery Distribution:    The right coronary artery is a large caliber vessel. The RCA arises normally from the right sinus of Valsalva, supplies the right posterolateral descending artery and supplies the posterolateral system. The RCA showed diffuse mild atherosclerotic disease.    Coronary Lesion Summary:  Vessel   Stenosis   Vessel Segment  OM 1   30% stenosis     ostial        Complications:  No in-lab complications observed.    Cardiac Cath Transition of Care Summary:  Post Procedure           Mild CAD.  Diagnosis:  Blood Loss:              Estimated blood loss during the procedure was minimal  mls.  Specimens Removed:       Number of specimen(s) removed: for sats.      Recommendations:  Maximize medical therapy.  Agressive risk factor modification efforts.  Telemetry monitoring.  Follow-up with cardiology clinic.  Monitor vitals and arterial access site/pulses.  Lipid lowering agent or Statin therapy.  Discussed RHC findings with referring physician Dr. Kumari, plan for diuresis,  optimal medical therapy for heart failure, and short course of inotropic  therapy.  Medical management of  coronary artery disease.  Aspirin therapy.    ____________________________________________________________________________________  CONCLUSIONS:  1. Mild nonobstructive CAD.  2. Elevated right and left sided filling pressures, depressed Kailey CO/CI.  RA mean 14, PA 55/32/42, PCWP 35, LVEDP 37.    ____________________________________________________________________________________  CPT Codes:  Right & Left Heart Cath w/ventriculography/Coronary angio (RHC)([facility])-55480; Moderate Sedation Services initial 15 minutes patient >5 years-37357    ICD 10 Codes:  I42.0-Dilated cardiomyopathy; I50.21-Acute systolic (congestive) heart failure    [id number] Donavon Manning MD  Performing Physician  Electronically signed by [id number] Donavon Manning MD on 6/15/2022 at 11:23:00 AM      cc Report to: [id number] BENIGNO KUMARI           Final   No results found for this or any previous visit from the past 3650 days.     Cardiac Scoring: No results found for this or any previous visit from the past 1825 days.    AAA : No results found for this or any previous visit from the past 1825 days.    OTHER: No results found for this or any previous visit from the past 1825 days.    LAST IMAGING RESULTS  ECG 12 Lead  Sinus rhythm, nonspecific ST and T wave abnormality, heart rate 91 bpm.      Problem List Items Addressed This Visit       Hypertension    Hyperlipemia    Coronary artery disease involving native coronary artery of native heart    Developmental disability    LEIDA on CPAP    Obesity (BMI 30-39.9)    Chronic heart failure with mildly reduced ejection fraction (HFmrEF, 41-49%) - Primary          Benigno Kumari DO, FACC, FACOI

## 2025-01-25 LAB
EJECTION FRACTION APICAL 4 CHAMBER: 62.6
EJECTION FRACTION: 43 %
LEFT ATRIUM VOLUME AREA LENGTH INDEX BSA: 21.2 ML/M2
LEFT VENTRICLE INTERNAL DIMENSION DIASTOLE: 5.79 CM (ref 3.5–6)
LEFT VENTRICULAR OUTFLOW TRACT DIAMETER: 2.11 CM
LV EJECTION FRACTION BIPLANE: 51 %
MITRAL VALVE E/A RATIO: 1.11
MITRAL VALVE E/E' RATIO: 4.61
RIGHT VENTRICLE FREE WALL PEAK S': 18.68 CM/S
TRICUSPID ANNULAR PLANE SYSTOLIC EXCURSION: 2 CM

## 2025-01-27 ENCOUNTER — TELEPHONE (OUTPATIENT)
Dept: CARDIOLOGY | Facility: HOSPITAL | Age: 45
End: 2025-01-27
Payer: COMMERCIAL

## 2025-01-27 NOTE — TELEPHONE ENCOUNTER
RN called and spoke with mother, notified of chocardiogram shows mildly reduced left ventricular systolic function with an ejection fraction of 45%, abnormal diastolic function, normal right ventricular systolic function, no significant valve abnormalities. Patient to see Dr. Kumari on 1/29/25. Agreeable to plan

## 2025-01-27 NOTE — TELEPHONE ENCOUNTER
----- Message from Rajesh Kumari sent at 1/26/2025  6:41 PM EST -----  Echocardiogram shows mildly reduced left ventricular systolic function with an ejection fraction of 45%, abnormal diastolic function, normal right ventricular systolic function, no significant valve abnormalities.

## 2025-01-29 ENCOUNTER — APPOINTMENT (OUTPATIENT)
Dept: CARDIOLOGY | Facility: CLINIC | Age: 45
End: 2025-01-29
Payer: MEDICARE

## 2025-01-29 VITALS
HEART RATE: 91 BPM | SYSTOLIC BLOOD PRESSURE: 112 MMHG | WEIGHT: 219 LBS | HEIGHT: 66 IN | DIASTOLIC BLOOD PRESSURE: 70 MMHG | BODY MASS INDEX: 35.2 KG/M2

## 2025-01-29 DIAGNOSIS — I50.22 CHRONIC HEART FAILURE WITH MILDLY REDUCED EJECTION FRACTION (HFMREF, 41-49%): Primary | ICD-10-CM

## 2025-01-29 DIAGNOSIS — I25.10 CORONARY ARTERY DISEASE INVOLVING NATIVE CORONARY ARTERY OF NATIVE HEART WITHOUT ANGINA PECTORIS: ICD-10-CM

## 2025-01-29 DIAGNOSIS — G47.33 OSA (OBSTRUCTIVE SLEEP APNEA): ICD-10-CM

## 2025-01-29 DIAGNOSIS — G47.33 OSA ON CPAP: ICD-10-CM

## 2025-01-29 DIAGNOSIS — F89 DEVELOPMENTAL DISABILITY: ICD-10-CM

## 2025-01-29 DIAGNOSIS — I50.42 CHRONIC COMBINED SYSTOLIC AND DIASTOLIC HEART FAILURE: ICD-10-CM

## 2025-01-29 DIAGNOSIS — E78.2 MIXED HYPERLIPIDEMIA: ICD-10-CM

## 2025-01-29 DIAGNOSIS — I10 PRIMARY HYPERTENSION: ICD-10-CM

## 2025-01-29 DIAGNOSIS — E66.9 OBESITY (BMI 30-39.9): ICD-10-CM

## 2025-01-29 DIAGNOSIS — I25.10 CORONARY ARTERY DISEASE INVOLVING NATIVE CORONARY ARTERY OF NATIVE HEART, UNSPECIFIED WHETHER ANGINA PRESENT: ICD-10-CM

## 2025-01-29 LAB
ATRIAL RATE: 91 BPM
P AXIS: 53 DEGREES
P OFFSET: 200 MS
P ONSET: 148 MS
PR INTERVAL: 152 MS
Q ONSET: 224 MS
QRS COUNT: 15 BEATS
QRS DURATION: 106 MS
QT INTERVAL: 358 MS
QTC CALCULATION(BAZETT): 440 MS
QTC FREDERICIA: 411 MS
R AXIS: 67 DEGREES
T AXIS: -31 DEGREES
T OFFSET: 403 MS
VENTRICULAR RATE: 91 BPM

## 2025-01-29 PROCEDURE — 1036F TOBACCO NON-USER: CPT | Performed by: INTERNAL MEDICINE

## 2025-01-29 PROCEDURE — 99214 OFFICE O/P EST MOD 30 MIN: CPT | Mod: 25 | Performed by: INTERNAL MEDICINE

## 2025-01-29 PROCEDURE — 3074F SYST BP LT 130 MM HG: CPT | Performed by: INTERNAL MEDICINE

## 2025-01-29 PROCEDURE — 93005 ELECTROCARDIOGRAM TRACING: CPT | Performed by: INTERNAL MEDICINE

## 2025-01-29 PROCEDURE — 99214 OFFICE O/P EST MOD 30 MIN: CPT | Performed by: INTERNAL MEDICINE

## 2025-01-29 PROCEDURE — 93010 ELECTROCARDIOGRAM REPORT: CPT | Performed by: INTERNAL MEDICINE

## 2025-01-29 PROCEDURE — 3008F BODY MASS INDEX DOCD: CPT | Performed by: INTERNAL MEDICINE

## 2025-01-29 PROCEDURE — 3078F DIAST BP <80 MM HG: CPT | Performed by: INTERNAL MEDICINE

## 2025-02-13 ENCOUNTER — APPOINTMENT (OUTPATIENT)
Dept: SLEEP MEDICINE | Facility: CLINIC | Age: 45
End: 2025-02-13
Payer: MEDICARE

## 2025-02-13 PROCEDURE — RXMED WILLOW AMBULATORY MEDICATION CHARGE

## 2025-02-17 ENCOUNTER — PHARMACY VISIT (OUTPATIENT)
Dept: PHARMACY | Facility: CLINIC | Age: 45
End: 2025-02-17
Payer: COMMERCIAL

## 2025-02-18 ENCOUNTER — OFFICE VISIT (OUTPATIENT)
Dept: PRIMARY CARE | Facility: CLINIC | Age: 45
End: 2025-02-18
Payer: COMMERCIAL

## 2025-02-18 VITALS
TEMPERATURE: 97.7 F | SYSTOLIC BLOOD PRESSURE: 128 MMHG | HEART RATE: 90 BPM | DIASTOLIC BLOOD PRESSURE: 84 MMHG | OXYGEN SATURATION: 95 %

## 2025-02-18 DIAGNOSIS — I50.23 ACUTE ON CHRONIC SYSTOLIC (CONGESTIVE) HEART FAILURE: ICD-10-CM

## 2025-02-18 DIAGNOSIS — E66.01 OBESITY, MORBID (MULTI): ICD-10-CM

## 2025-02-18 DIAGNOSIS — J01.00 ACUTE MAXILLARY SINUSITIS, RECURRENCE NOT SPECIFIED: Primary | ICD-10-CM

## 2025-02-18 PROCEDURE — G2211 COMPLEX E/M VISIT ADD ON: HCPCS | Performed by: FAMILY MEDICINE

## 2025-02-18 PROCEDURE — 99213 OFFICE O/P EST LOW 20 MIN: CPT | Performed by: FAMILY MEDICINE

## 2025-02-18 PROCEDURE — 3074F SYST BP LT 130 MM HG: CPT | Performed by: FAMILY MEDICINE

## 2025-02-18 PROCEDURE — 3079F DIAST BP 80-89 MM HG: CPT | Performed by: FAMILY MEDICINE

## 2025-02-18 RX ORDER — ALBUTEROL SULFATE 90 UG/1
2 INHALANT RESPIRATORY (INHALATION) EVERY 4 HOURS PRN
Qty: 8.5 G | Refills: 5 | Status: SHIPPED | OUTPATIENT
Start: 2025-02-18 | End: 2026-02-18

## 2025-02-18 RX ORDER — DESIPRAMINE HYDROCHLORIDE 25 MG/1
25 TABLET ORAL
COMMUNITY
Start: 2025-02-04

## 2025-02-18 RX ORDER — AZITHROMYCIN 250 MG/1
TABLET, FILM COATED ORAL
Qty: 6 TABLET | Refills: 0 | Status: SHIPPED | OUTPATIENT
Start: 2025-02-18 | End: 2025-02-23

## 2025-02-18 ASSESSMENT — ENCOUNTER SYMPTOMS: COUGH: 1

## 2025-02-18 NOTE — PROGRESS NOTES
Subjective   Patient ID: Zhang Alvarado is a 44 y.o. male who presents for Cough (X10 days ) and Nasal Congestion.  Cough     cough and congestion x 10 days.  Has been exposed to flu earlier.  No recent fever.  No body aches.  Having some sore throat.  Having some postnasal drip.  Denies feeling short of breath.    Patient Active Problem List   Diagnosis    Hypertension    Hyperlipemia    Chronic combined systolic and diastolic heart failure    Coronary artery disease involving native coronary artery of native heart    Developmental disability    LEIDA on CPAP    Obesity (BMI 30-39.9)    Gastroesophageal reflux disease    Impulse disorder, unspecified    Restless legs syndrome    Chronic heart failure with mildly reduced ejection fraction (HFmrEF, 41-49%)       Social Connections: Not on File (9/12/2024)    Received from [employer]    Social [employer]     Connectedness: 0       Current Outpatient Medications on File Prior to Visit   Medication Sig Dispense Refill    desipramine (Norpramin) 25 mg tablet Take 1 tablet (25 mg) by mouth.      aspirin 81 mg EC tablet Take by mouth.      atorvastatin (Lipitor) 20 mg tablet Take 1 tablet (20 mg) by mouth daily at bedtime 90 tablet 1    cholecalciferol (Vitamin D-3) 25 MCG (1000 UT) capsule Take 1 capsule (25 mcg) by mouth once daily.      furosemide (Lasix) 20 mg tablet TAKE 1 TABLET BY MOUTH ONCE DAILY 90 tablet 1    metoprolol succinate XL (Toprol-XL) 25 mg 24 hr tablet TAKE 1 TABLET BY MOUTH TWO TIMES A  tablet 3    multivitamin (Daily Multi-Vitamin) tablet Take by mouth once daily.      polyethylene glycol (Glycolax, Miralax) 17 gram/dose powder Mix of powder and drink. 1 capful daily 510 g 11    risperiDONE (RisperDAL) 0.5 mg tablet Take 1 tablet (0.5 mg) by mouth 2 times a day.      sacubitriL-valsartan (Entresto) 24-26 mg tablet TAKE 1 TABLET BY MOUTH TWICE A  tablet 1    sacubitriL-valsartan (Entresto) 24-26 mg tablet TAKE 1 TABLET BY MOUTH TWICE A   tablet 3    sennosides (senna) 8.6 mg tablet Take 1 tablet (8.6 mg) by mouth once daily. 90 tablet 2    spironolactone (Aldactone) 25 mg tablet TAKE 0.5 TABLETS BY MOUTH ONCE DAILY. 45 tablet 1    [DISCONTINUED] desipramine (Norpramin) 50 mg tablet Take 1 tablet (50 mg) by mouth once daily at bedtime.       No current facility-administered medications on file prior to visit.        Vitals:    02/18/25 1335   BP: 128/84   Pulse: 90   Temp: 36.5 °C (97.7 °F)   SpO2: 95%     There were no vitals filed for this visit.    Review of Systems   Respiratory:  Positive for cough.    All other systems reviewed and are negative.      Objective     Physical Exam  Vitals reviewed.   Constitutional:       General: He is not in acute distress.     Appearance: Normal appearance. He is well-developed. He is not diaphoretic.   HENT:      Head: Normocephalic and atraumatic.      Right Ear: Tympanic membrane normal.      Left Ear: Tympanic membrane normal.      Nose: Congestion present.      Mouth/Throat:      Mouth: Mucous membranes are moist.   Eyes:      Pupils: Pupils are equal, round, and reactive to light.   Cardiovascular:      Rate and Rhythm: Normal rate and regular rhythm.      Heart sounds: Normal heart sounds. No murmur heard.     No friction rub. No gallop.   Pulmonary:      Effort: Pulmonary effort is normal.      Breath sounds: Wheezing present. No rales.   Abdominal:      General: Bowel sounds are normal.      Palpations: Abdomen is soft.      Tenderness: There is no abdominal tenderness.   Musculoskeletal:      Cervical back: Normal range of motion and neck supple.   Skin:     General: Skin is warm and dry.   Neurological:      Mental Status: He is alert.   Psychiatric:         Mood and Affect: Mood normal.         Office Visit on 01/29/2025   Component Date Value Ref Range Status    Ventricular Rate 01/29/2025 91  BPM Final    Atrial Rate 01/29/2025 91  BPM Final    IL Interval 01/29/2025 152  ms Final    QRS Duration  01/29/2025 106  ms Final    QT Interval 01/29/2025 358  ms Final    QTC Calculation(Bazett) 01/29/2025 440  ms Final    P Axis 01/29/2025 53  degrees Final    R Axis 01/29/2025 67  degrees Final    T Axis 01/29/2025 -31  degrees Final    QRS Count 01/29/2025 15  beats Final    Q Onset 01/29/2025 224  ms Final    P Onset 01/29/2025 148  ms Final    P Offset 01/29/2025 200  ms Final    T Offset 01/29/2025 403  ms Final    QTC Fredericia 01/29/2025 411  ms Final   Hospital Outpatient Visit on 01/24/2025   Component Date Value Ref Range Status    LVOT diam 01/24/2025 2.11  cm Final    LV Biplane EF 01/24/2025 51  % Final    MV E/A ratio 01/24/2025 1.11   Final    MV avg E/e' ratio 01/24/2025 4.61   Final    Tricuspid annular plane systolic e* 01/24/2025 2.0  cm Final    LA vol index A/L 01/24/2025 21.2  ml/m2 Final    LV EF 01/24/2025 43  % Final    RV free wall pk S' 01/24/2025 18.68  cm/s Final    LVIDd 01/24/2025 5.79  cm Final    LV A4C EF 01/24/2025 62.6   Final   Lab on 01/15/2025   Component Date Value Ref Range Status    Glucose 01/15/2025 87  74 - 99 mg/dL Final    Sodium 01/15/2025 138  136 - 145 mmol/L Final    Potassium 01/15/2025 4.0  3.5 - 5.3 mmol/L Final    Chloride 01/15/2025 99  98 - 107 mmol/L Final    Bicarbonate 01/15/2025 31  21 - 32 mmol/L Final    Anion Gap 01/15/2025 12  10 - 20 mmol/L Final    Urea Nitrogen 01/15/2025 14  6 - 23 mg/dL Final    Creatinine 01/15/2025 1.07  0.50 - 1.30 mg/dL Final    eGFR 01/15/2025 88  >60 mL/min/1.73m*2 Final    Calculations of estimated GFR are performed using the 2021 CKD-EPI Study Refit equation without the race variable for the IDMS-Traceable creatinine methods.  https://jasn.asnjournals.org/content/early/2021/09/22/ASN.0143883463    Calcium 01/15/2025 9.8  8.6 - 10.3 mg/dL Final    Magnesium 01/15/2025 1.95  1.60 - 2.40 mg/dL Final    BNP 01/15/2025 10  0 - 99 pg/mL Final       Assessment/Plan   Problem List Items Addressed This Visit             ICD-10-CM     Acute on chronic systolic (congestive) heart failure I50.23    Obesity, morbid (Multi) E66.01     Other Visit Diagnoses         Codes    Acute maxillary sinusitis, recurrence not specified    -  Primary J01.00    Relevant Medications    albuterol (ProAir HFA) 90 mcg/actuation inhaler    inhalational spacing device inhaler    azithromycin (Zithromax) 250 mg tablet          Azithro + albuterol

## 2025-02-28 ENCOUNTER — PHARMACY VISIT (OUTPATIENT)
Dept: PHARMACY | Facility: CLINIC | Age: 45
End: 2025-02-28
Payer: COMMERCIAL

## 2025-02-28 DIAGNOSIS — I50.42 CHRONIC COMBINED SYSTOLIC AND DIASTOLIC HEART FAILURE: ICD-10-CM

## 2025-02-28 DIAGNOSIS — I10 HYPERTENSION, UNSPECIFIED TYPE: ICD-10-CM

## 2025-02-28 PROCEDURE — RXMED WILLOW AMBULATORY MEDICATION CHARGE

## 2025-02-28 RX ORDER — SPIRONOLACTONE 25 MG/1
12.5 TABLET ORAL DAILY
Qty: 45 TABLET | Refills: 1 | Status: SHIPPED | OUTPATIENT
Start: 2025-02-28

## 2025-02-28 RX ORDER — METOPROLOL SUCCINATE 25 MG/1
25 TABLET, EXTENDED RELEASE ORAL 2 TIMES DAILY
Qty: 180 TABLET | Refills: 3 | Status: SHIPPED | OUTPATIENT
Start: 2025-02-28 | End: 2026-02-28

## 2025-03-12 PROCEDURE — RXMED WILLOW AMBULATORY MEDICATION CHARGE

## 2025-03-13 ENCOUNTER — PHARMACY VISIT (OUTPATIENT)
Dept: PHARMACY | Facility: CLINIC | Age: 45
End: 2025-03-13
Payer: COMMERCIAL

## 2025-03-20 PROCEDURE — RXMED WILLOW AMBULATORY MEDICATION CHARGE

## 2025-03-21 ENCOUNTER — PHARMACY VISIT (OUTPATIENT)
Dept: PHARMACY | Facility: CLINIC | Age: 45
End: 2025-03-21
Payer: COMMERCIAL

## 2025-04-01 ENCOUNTER — PHARMACY VISIT (OUTPATIENT)
Dept: PHARMACY | Facility: CLINIC | Age: 45
End: 2025-04-01
Payer: COMMERCIAL

## 2025-04-01 PROCEDURE — RXMED WILLOW AMBULATORY MEDICATION CHARGE

## 2025-04-30 PROCEDURE — RXMED WILLOW AMBULATORY MEDICATION CHARGE

## 2025-05-01 ENCOUNTER — PHARMACY VISIT (OUTPATIENT)
Dept: PHARMACY | Facility: CLINIC | Age: 45
End: 2025-05-01
Payer: COMMERCIAL

## 2025-05-26 DIAGNOSIS — E78.2 MIXED HYPERLIPIDEMIA: ICD-10-CM

## 2025-05-26 DIAGNOSIS — I50.42 CHRONIC COMBINED SYSTOLIC AND DIASTOLIC HEART FAILURE: ICD-10-CM

## 2025-05-26 PROCEDURE — RXMED WILLOW AMBULATORY MEDICATION CHARGE

## 2025-05-26 RX ORDER — ATORVASTATIN CALCIUM 20 MG/1
TABLET, FILM COATED ORAL
Qty: 90 TABLET | Refills: 1 | Status: CANCELLED | OUTPATIENT
Start: 2025-05-26

## 2025-05-26 RX ORDER — FUROSEMIDE 20 MG/1
20 TABLET ORAL DAILY
Qty: 90 TABLET | Refills: 1 | Status: CANCELLED | OUTPATIENT
Start: 2025-05-26 | End: 2026-05-26

## 2025-05-27 DIAGNOSIS — E78.2 MIXED HYPERLIPIDEMIA: ICD-10-CM

## 2025-05-27 DIAGNOSIS — I50.42 CHRONIC COMBINED SYSTOLIC AND DIASTOLIC HEART FAILURE: ICD-10-CM

## 2025-05-29 ENCOUNTER — TELEPHONE (OUTPATIENT)
Dept: CARDIOLOGY | Facility: HOSPITAL | Age: 45
End: 2025-05-29
Payer: MEDICARE

## 2025-05-29 ENCOUNTER — APPOINTMENT (OUTPATIENT)
Dept: SLEEP MEDICINE | Facility: CLINIC | Age: 45
End: 2025-05-29
Payer: COMMERCIAL

## 2025-05-29 ENCOUNTER — PHARMACY VISIT (OUTPATIENT)
Dept: PHARMACY | Facility: CLINIC | Age: 45
End: 2025-05-29
Payer: COMMERCIAL

## 2025-05-29 PROCEDURE — RXMED WILLOW AMBULATORY MEDICATION CHARGE

## 2025-05-29 RX ORDER — ATORVASTATIN CALCIUM 20 MG/1
TABLET, FILM COATED ORAL
Qty: 90 TABLET | Refills: 3 | Status: SHIPPED | OUTPATIENT
Start: 2025-05-29

## 2025-05-29 RX ORDER — FUROSEMIDE 20 MG/1
20 TABLET ORAL DAILY
Qty: 90 TABLET | Refills: 1 | Status: SHIPPED | OUTPATIENT
Start: 2025-05-29 | End: 2026-05-29

## 2025-05-29 NOTE — TELEPHONE ENCOUNTER
Patients mother stopped into office asking for a refill of the atorvastatin (Lipitor) 20 mg tablet [[id number]] & furosemide (Lasix) 20 mg tablet [[id number]] and asks thry be sent to the   [facility]  [address] [place] [id number]  Phone: [phone]  Fax: [phone]   Mother states patient has 0 remaining pills and needs a refill as soon as possible.

## 2025-06-03 ENCOUNTER — PHARMACY VISIT (OUTPATIENT)
Dept: PHARMACY | Facility: CLINIC | Age: 45
End: 2025-06-03
Payer: COMMERCIAL

## 2025-06-11 ENCOUNTER — APPOINTMENT (OUTPATIENT)
Dept: PRIMARY CARE | Facility: CLINIC | Age: 45
End: 2025-06-11
Payer: COMMERCIAL

## 2025-06-16 ENCOUNTER — APPOINTMENT (OUTPATIENT)
Dept: PRIMARY CARE | Facility: CLINIC | Age: 45
End: 2025-06-16
Payer: COMMERCIAL

## 2025-06-16 ENCOUNTER — OFFICE VISIT (OUTPATIENT)
Dept: PRIMARY CARE | Facility: CLINIC | Age: 45
End: 2025-06-16
Payer: MEDICARE

## 2025-06-16 VITALS
TEMPERATURE: 97.5 F | SYSTOLIC BLOOD PRESSURE: 132 MMHG | HEART RATE: 100 BPM | HEIGHT: 64 IN | OXYGEN SATURATION: 96 % | BODY MASS INDEX: 36.5 KG/M2 | WEIGHT: 213.8 LBS | DIASTOLIC BLOOD PRESSURE: 70 MMHG

## 2025-06-16 DIAGNOSIS — I10 PRIMARY HYPERTENSION: ICD-10-CM

## 2025-06-16 DIAGNOSIS — E78.2 MIXED HYPERLIPIDEMIA: ICD-10-CM

## 2025-06-16 DIAGNOSIS — K59.04 CHRONIC IDIOPATHIC CONSTIPATION: Primary | ICD-10-CM

## 2025-06-16 DIAGNOSIS — E66.01 OBESITY, MORBID (MULTI): ICD-10-CM

## 2025-06-16 DIAGNOSIS — I25.10 CORONARY ARTERY DISEASE INVOLVING NATIVE CORONARY ARTERY OF NATIVE HEART WITHOUT ANGINA PECTORIS: ICD-10-CM

## 2025-06-16 DIAGNOSIS — Z00.00 MEDICARE ANNUAL WELLNESS VISIT, SUBSEQUENT: ICD-10-CM

## 2025-06-16 PROCEDURE — 1036F TOBACCO NON-USER: CPT | Performed by: FAMILY MEDICINE

## 2025-06-16 PROCEDURE — G0439 PPPS, SUBSEQ VISIT: HCPCS | Performed by: FAMILY MEDICINE

## 2025-06-16 PROCEDURE — 3008F BODY MASS INDEX DOCD: CPT | Performed by: FAMILY MEDICINE

## 2025-06-16 PROCEDURE — 3075F SYST BP GE 130 - 139MM HG: CPT | Performed by: FAMILY MEDICINE

## 2025-06-16 PROCEDURE — 99214 OFFICE O/P EST MOD 30 MIN: CPT | Performed by: FAMILY MEDICINE

## 2025-06-16 PROCEDURE — 3078F DIAST BP <80 MM HG: CPT | Performed by: FAMILY MEDICINE

## 2025-06-16 RX ORDER — AMOXICILLIN 250 MG
1 CAPSULE ORAL DAILY
Qty: 90 TABLET | Refills: 3 | Status: SHIPPED | OUTPATIENT
Start: 2025-06-16 | End: 2026-06-16

## 2025-06-16 ASSESSMENT — PATIENT HEALTH QUESTIONNAIRE - PHQ9
SUM OF ALL RESPONSES TO PHQ9 QUESTIONS 1 AND 2: 0
1. LITTLE INTEREST OR PLEASURE IN DOING THINGS: NOT AT ALL
2. FEELING DOWN, DEPRESSED OR HOPELESS: NOT AT ALL

## 2025-06-16 ASSESSMENT — ACTIVITIES OF DAILY LIVING (ADL)
DOING_HOUSEWORK: TOTAL CARE
TAKING_MEDICATION: TOTAL CARE
DRESSING: NEEDS ASSISTANCE
GROCERY_SHOPPING: TOTAL CARE
MANAGING_FINANCES: TOTAL CARE
BATHING: NEEDS ASSISTANCE

## 2025-06-16 NOTE — PROGRESS NOTES
Subjective   Patient ID: Zhang Alvarado is a 45 y.o. male who presents for Medicare Annual Wellness Visit Subsequent (AWE).  HPI    CHF: sees cardiology    Constipation: has a lot of large, hard stools.  Didn't do well with Miralax.    Preparing meals - dependent  Using telephone - independent  Bladder - incontinent at times in the morning  Bowel - continent    Recent hospital stays - none    ADLs - unable able to dress, walk and bath independently  Instrumental ADL's - unable to shop, maintain finances, managing medications, housekeeping independently    Depression: PHQ-2 (screening 2 mins) - negative    Opioid use -   none   Exercise -  does bocce  Diet - well balanced  Pain score - 0/10    Providers - sees cardiology    Mini[employer] dementia screen - NA    Education - educated pt on healthy eating, exercise    Falls - no falls    Counseled pt on living will    AAA screening: NA     Prostate CA screen:  NA    CV screening: sees cardiology    Colonoscopy:  NA    Diabetes screening:  Neg    Glaucoma screen:  NA    CT chest tob screen: NA    Vaccines: none      Patient Active Problem List   Diagnosis    Hypertension    Hyperlipemia    Chronic combined systolic and diastolic heart failure    Coronary artery disease involving native coronary artery of native heart    Developmental disability    LEIDA on CPAP    Obesity (BMI 30-39.9)    Gastroesophageal reflux disease    Impulse disorder, unspecified    Restless legs syndrome    Chronic heart failure with mildly reduced ejection fraction (HFmrEF, 41-49%)    Acute on chronic systolic (congestive) heart failure    Obesity, morbid (Multi)       Social Connections: Not on File (9/12/2024)    Received from [facility]    Social [employer]     Connectedness: 0       Current Outpatient Medications on File Prior to Visit   Medication Sig Dispense Refill    albuterol (ProAir HFA) 90 mcg/actuation inhaler Inhale 2 puffs every 4 hours if needed for wheezing or shortness of breath. 8.5 g 5     aspirin 81 mg EC tablet Take by mouth.      atorvastatin (Lipitor) 20 mg tablet Take 1 tablet (20 mg) by mouth daily at bedtime 90 tablet 3    cholecalciferol (Vitamin D-3) 25 MCG (1000 UT) capsule Take 1 capsule (25 mcg) by mouth once daily.      desipramine (Norpramin) 25 mg tablet Take 1 tablet (25 mg) by mouth.      furosemide (Lasix) 20 mg tablet TAKE 1 TABLET BY MOUTH ONCE DAILY 90 tablet 1    inhalational spacing device inhaler Use as directed with inhalers 1 each 0    metoprolol succinate XL (Toprol-XL) 25 mg 24 hr tablet TAKE 1 TABLET BY MOUTH TWO TIMES A  tablet 3    multivitamin (Daily Multi-Vitamin) tablet Take by mouth once daily.      risperiDONE (RisperDAL) 0.5 mg tablet Take 1 tablet (0.5 mg) by mouth 2 times a day.      sacubitriL-valsartan (Entresto) 24-26 mg tablet TAKE 1 TABLET BY MOUTH TWICE A  tablet 3    spironolactone (Aldactone) 25 mg tablet Take 0.5 tablets (12.5 mg) by mouth once daily. 45 tablet 1    [DISCONTINUED] polyethylene glycol (Glycolax, Miralax) 17 gram/dose powder Mix of powder and drink. 1 capful daily (Patient not taking: Mix of powder and drink. Reported on 6/16/2025) 510 g 11    [DISCONTINUED] sacubitriL-valsartan (Entresto) 24-26 mg tablet TAKE 1 TABLET BY MOUTH TWICE A  tablet 1     No current facility-administered medications on file prior to visit.        Vitals:    06/16/25 1328   BP: 132/70   Pulse: 100   Temp: 36.4 °C (97.5 °F)   SpO2: 96%     Vitals:    06/16/25 1328   Weight: 97 kg (213 lb 12.8 oz)       Review of Systems   All other systems reviewed and are negative.      Objective     Physical Exam  Vitals reviewed.   Constitutional:       General: He is not in acute distress.     Appearance: Normal appearance. He is well-developed. He is not diaphoretic.   HENT:      Head: Normocephalic and atraumatic.      Right Ear: Tympanic membrane normal.      Left Ear: Tympanic membrane normal.      Nose: Nose normal.      Mouth/Throat:      Mouth:  Mucous membranes are moist.   Eyes:      Pupils: Pupils are equal, round, and reactive to light.   Cardiovascular:      Rate and Rhythm: Normal rate and regular rhythm.      Heart sounds: Normal heart sounds. No murmur heard.     No friction rub. No gallop.   Pulmonary:      Effort: Pulmonary effort is normal.      Breath sounds: Normal breath sounds. No rales.   Abdominal:      General: Bowel sounds are normal.      Palpations: Abdomen is soft.      Tenderness: There is no abdominal tenderness.   Musculoskeletal:      Cervical back: Normal range of motion and neck supple.   Skin:     General: Skin is warm and dry.   Neurological:      Mental Status: He is alert.   Psychiatric:         Mood and Affect: Mood normal.         No visits with results within 2 Month(s) from this visit.   Latest known visit with results is:   Office Visit on 01/29/2025   Component Date Value Ref Range Status    Ventricular Rate 01/29/2025 91  BPM Final    Atrial Rate 01/29/2025 91  BPM Final    IL Interval 01/29/2025 152  ms Final    QRS Duration 01/29/2025 106  ms Final    QT Interval 01/29/2025 358  ms Final    QTC Calculation(Bazett) 01/29/2025 440  ms Final    P Axis 01/29/2025 53  degrees Final    R Axis 01/29/2025 67  degrees Final    T Axis 01/29/2025 -31  degrees Final    QRS Count 01/29/2025 15  beats Final    Q Onset 01/29/2025 224  ms Final    P Onset 01/29/2025 148  ms Final    P Offset 01/29/2025 200  ms Final    T Offset 01/29/2025 403  ms Final    QTC Fredericia 01/29/2025 411  ms Final       Assessment/Plan   Problem List Items Addressed This Visit           ICD-10-CM    Hypertension I10    Hyperlipemia E78.5    Coronary artery disease involving native coronary artery of native heart I25.10    Obesity, morbid (Multi) E66.01     Other Visit Diagnoses         Codes      Chronic idiopathic constipation    -  Primary K59.04    Relevant Medications    sennosides-docusate sodium (Linda-Colace) 8.6-50 mg tablet      Medicare annual  wellness visit, subsequent     Z00.00          Doing well.  Talked to pt and his mother about Zepbound/Mounjaro.  It might be a good choice for him to help lower his weight and possibly to improve his heart function.  Fu in 12 mo.

## 2025-06-17 ENCOUNTER — TELEPHONE (OUTPATIENT)
Dept: PRIMARY CARE | Facility: CLINIC | Age: 45
End: 2025-06-17
Payer: MEDICARE

## 2025-06-17 DIAGNOSIS — L72.9 INFECTED CYST OF SKIN: Primary | ICD-10-CM

## 2025-06-17 DIAGNOSIS — L08.9 INFECTED CYST OF SKIN: Primary | ICD-10-CM

## 2025-06-17 NOTE — TELEPHONE ENCOUNTER
Pt mother called stating that when he was seen yesterday that they discussed an abx for the cyst he has on his back, but no abx was sent in.   Please advise.

## 2025-06-18 RX ORDER — DOXYCYCLINE 100 MG/1
100 CAPSULE ORAL 2 TIMES DAILY
Qty: 14 CAPSULE | Refills: 0 | Status: SHIPPED | OUTPATIENT
Start: 2025-06-18 | End: 2025-06-25

## 2025-06-30 ENCOUNTER — TELEPHONE (OUTPATIENT)
Dept: PRIMARY CARE | Facility: CLINIC | Age: 45
End: 2025-06-30
Payer: MEDICARE

## 2025-06-30 NOTE — TELEPHONE ENCOUNTER
Patient's mother called stating the boil/growth on back has not gotten better and the white center has gotten bigger. Asking if [facility] wants to call in another round of ABX? Should patient be re-evaluated? Patient would need to be seen before 7/2 due to mother being out of town.  Please advise

## 2025-06-30 NOTE — TELEPHONE ENCOUNTER
Might need to come in to be lanced and drained.  He can also follow up with general surgery about that.

## 2025-07-01 ENCOUNTER — OFFICE VISIT (OUTPATIENT)
Dept: PRIMARY CARE | Facility: CLINIC | Age: 45
End: 2025-07-01
Payer: MEDICARE

## 2025-07-01 VITALS
TEMPERATURE: 97.4 F | HEART RATE: 83 BPM | OXYGEN SATURATION: 96 % | WEIGHT: 215 LBS | DIASTOLIC BLOOD PRESSURE: 76 MMHG | SYSTOLIC BLOOD PRESSURE: 128 MMHG | BODY MASS INDEX: 36.9 KG/M2

## 2025-07-01 DIAGNOSIS — L72.3 SEBACEOUS CYST: ICD-10-CM

## 2025-07-01 DIAGNOSIS — L08.9 INFECTED CYST OF SKIN: ICD-10-CM

## 2025-07-01 DIAGNOSIS — L72.9 INFECTED CYST OF SKIN: ICD-10-CM

## 2025-07-01 DIAGNOSIS — D23.9 SEBACEOMA: Primary | ICD-10-CM

## 2025-07-01 PROCEDURE — 3074F SYST BP LT 130 MM HG: CPT | Performed by: FAMILY MEDICINE

## 2025-07-01 PROCEDURE — 1036F TOBACCO NON-USER: CPT | Performed by: FAMILY MEDICINE

## 2025-07-01 PROCEDURE — 3078F DIAST BP <80 MM HG: CPT | Performed by: FAMILY MEDICINE

## 2025-07-01 PROCEDURE — G2211 COMPLEX E/M VISIT ADD ON: HCPCS | Performed by: FAMILY MEDICINE

## 2025-07-01 PROCEDURE — 99213 OFFICE O/P EST LOW 20 MIN: CPT | Performed by: FAMILY MEDICINE

## 2025-07-01 RX ORDER — DOXYCYCLINE 100 MG/1
100 CAPSULE ORAL 2 TIMES DAILY
Qty: 14 CAPSULE | Refills: 0 | Status: SHIPPED | OUTPATIENT
Start: 2025-07-01 | End: 2025-07-08

## 2025-07-01 NOTE — PROGRESS NOTES
"Subjective   Patient ID: Zhang Alvarado is a 45 y.o. male who presents for Sore (On back x \" long time\". NKI).  HPI  Pt presents to recheck cyst on his back.  It is better than it was but still erythematous and firm.    Problem List[1]    Social Connections: Not on File (9/12/2024)    Received from [employer]    Social [employer]     Connectedness: 0       Medications Ordered Prior to Encounter[2]     Vitals:    07/01/25 1125   BP: 128/76   Pulse: 83   Temp: 36.3 °C (97.4 °F)   SpO2: 96%     Vitals:    07/01/25 1125   Weight: 97.5 kg (215 lb)       Review of Systems    Objective     Physical Exam    No visits with results within 2 Month(s) from this visit.   Latest known visit with results is:   Office Visit on 01/29/2025   Component Date Value Ref Range Status    Ventricular Rate 01/29/2025 91  BPM Final    Atrial Rate 01/29/2025 91  BPM Final    AZ Interval 01/29/2025 152  ms Final    QRS Duration 01/29/2025 106  ms Final    QT Interval 01/29/2025 358  ms Final    QTC Calculation(Bazett) 01/29/2025 440  ms Final    P Axis 01/29/2025 53  degrees Final    R Axis 01/29/2025 67  degrees Final    T Axis 01/29/2025 -31  degrees Final    QRS Count 01/29/2025 15  beats Final    Q Onset 01/29/2025 224  ms Final    P Onset 01/29/2025 148  ms Final    P Offset 01/29/2025 200  ms Final    T Offset 01/29/2025 403  ms Final    QTC Fredericia 01/29/2025 411  ms Final       Assessment/Plan   Problem List Items Addressed This Visit    None  Visit Diagnoses         Codes      Sebaceoma    -  Primary D23.9      Infected cyst of skin     L72.9, L08.9    Relevant Medications    doxycycline (Vibramycin) 100 mg capsule      Sebaceous cyst     L72.3    Relevant Orders    Referral to General Surgery          Doxy x 7 more days.  When healed ref to gen surg for cyst removal.       [1]   Patient Active Problem List  Diagnosis    Hypertension    Hyperlipemia    Chronic combined systolic and diastolic heart failure    Coronary artery disease " involving native coronary artery of native heart    Developmental disability    LEIDA on CPAP    Obesity (BMI 30-39.9)    Gastroesophageal reflux disease    Impulse disorder, unspecified    Restless legs syndrome    Chronic heart failure with mildly reduced ejection fraction (HFmrEF, 41-49%)    Acute on chronic systolic (congestive) heart failure    Obesity, morbid (Multi)   [2]   Current Outpatient Medications on File Prior to Visit   Medication Sig Dispense Refill    albuterol (ProAir HFA) 90 mcg/actuation inhaler Inhale 2 puffs every 4 hours if needed for wheezing or shortness of breath. 8.5 g 5    aspirin 81 mg EC tablet Take by mouth.      atorvastatin (Lipitor) 20 mg tablet Take 1 tablet (20 mg) by mouth daily at bedtime 90 tablet 3    cholecalciferol (Vitamin D-3) 25 MCG (1000 UT) capsule Take 1 capsule (25 mcg) by mouth once daily.      desipramine (Norpramin) 25 mg tablet Take 1 tablet (25 mg) by mouth.      furosemide (Lasix) 20 mg tablet TAKE 1 TABLET BY MOUTH ONCE DAILY 90 tablet 1    inhalational spacing device inhaler Use as directed with inhalers 1 each 0    metoprolol succinate XL (Toprol-XL) 25 mg 24 hr tablet TAKE 1 TABLET BY MOUTH TWO TIMES A  tablet 3    multivitamin (Daily Multi-Vitamin) tablet Take by mouth once daily.      risperiDONE (RisperDAL) 0.5 mg tablet Take 1 tablet (0.5 mg) by mouth 2 times a day.      sacubitriL-valsartan (Entresto) 24-26 mg tablet TAKE 1 TABLET BY MOUTH TWICE A  tablet 3    sennosides-docusate sodium (Linda-Colace) 8.6-50 mg tablet Take 1 tablet by mouth once daily. 90 tablet 3    spironolactone (Aldactone) 25 mg tablet Take 0.5 tablets (12.5 mg) by mouth once daily. 45 tablet 1    [] doxycycline (Vibramycin) 100 mg capsule Take 1 capsule (100 mg) by mouth 2 times a day for 7 days. Take with at least 8 ounces (large glass) of water, do not lie down for 30 minutes after 14 capsule 0     No current facility-administered medications on file prior to  visit.

## 2025-07-11 NOTE — PROGRESS NOTES
[facility]    Patient Name: Zhang Alvarado  Patient : 1980    Scribe Attestation  By signing my name below, Chelly LONGORIA, Marlyibsophia attest that this documentation has been prepared under the direction and in the presence of Rajesh Kumari DO.    Physician Attestation  Rajesh LONGORIA DO, personally performed the services described in the documentation as scribed by Chelly Hernandez in my presence, and confirm it is both accurate and complete.    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  The patient was last evaluated by me in 2025.  At that visit I ordered blood work including CMP/lipid/magnesium/CBC/BNP to be drawn in 6 months and asked the patient to follow-up in 6 months and sooner if necessary. CMP done on 2025 showed normal serum sodium and serum potassium with a serum creatinine of 1.09. Normal ALT and AST. Serum magnesium was 2.1. BNP was <4. CBC showed a hemoglobin of 14.7. Lipid panel done in 2025 showed an LDL cholesterol of 68 and triglycerides of 174  on atorvastatin 20 mg daily. ECG done today showed sinus rhythm with a heart rate of 94 bpm. The patient reports that he has been feeling generally well from the cardiac standpoint. He denies any new chest pain, shortness of breath, palpitations and lightheadedness. He states that he takes all of his medications as prescribed. During my exam, he was resting  comfortably on the exam table.            Assessment/Plan:   1. Chronic HFmrEF  The patient has chronic HFmrEF with an ejection fraction of 45%.  Echocardiogram done in January 2025 showed mildly reduced left ventricular systolic function with an ejection fraction of 45%, abnormal diastolic function, normal right ventricular systolic function, no significant valve abnormalities.   He does not appear significantly volume overloaded on exam today.   He should continue current heart failure medications.  Recent lab works as noted in the HPI.   Lab works as noted below will be done in 6 months prior to his next visit.  I discussed with him the importance of following a low-sodium heart healthy diet as well as weight loss.  Follow up in 6 months and sooner if necessary.      2. Coronary artery disease  The patient has a history of minimal coronary artery disease as seen on cardiac catheterization done in June 2022.  ECG done today showed sinus rhythm with a heart rate of 94 bpm.  He denies anginal chest discomfort.  Blood pressure appears controlled on exam today.  He should continue current antihypertensive medications and antiplatelet therapy.  Echocardiogram done in January 2025 showed mildly reduced left ventricular systolic function with an ejection fraction of 45%, abnormal diastolic function, normal right ventricular systolic function, no significant valve abnormalities.   Recent lab works as noted in the HPI.   Lipid panel done in July 2025 showed an LDL cholesterol of 68 and triglycerides of 174  on atorvastatin 20 mg daily.  Lab works as noted below will be done in 6 months prior to his next visit.   Please see lifestyle recommendations below.   Follow up in 6 months and sooner if necessary.      3. Hypertension  The patient has a history of hypertension which appears controlled on exam today.  He should continue his current antihypertensive medications and monitor his blood pressure at home.      4.  Dyslipidemia  Lipid panel done in July 2025 showed an LDL cholesterol of 68 and triglycerides of 174  on atorvastatin 20 mg daily.  Please see lifestyle recommendations below.      5. Developmental disability  Management as per PCP.      6. Obstructive sleep apnea  The patient has a history of obstructive sleep apnea.  He should continue to follow up with Sleep Medicine.     7. Obesity  Please see lifestyle recommendations below.          Orders:   BMP/BNP/magnesium in 6 months,   Follow-up in 6 months.    Lifestyle Recommendations  I recommend a whole-food plant-based diet, an eating pattern that encourages the consumption of unrefined plant foods (such as fruits, vegetables, tubers, whole grains, legumes, nuts and seeds) and discourages meats, dairy products, eggs and processed foods.     The AHA/ACC recommends that the patient consume a dietary pattern that emphasizes intake of vegetables, fruits, and whole grains; includes low-fat dairy products, poultry, fish, legumes, non-tropical vegetable oils, and nuts; and limits intake of sodium, sweets, sugar-sweetened beverages, and red meats.  Adapt this dietary pattern to appropriate calorie requirements (a 500-750 kcal/day deficit to loose weight), personal and cultural food preferences, and nutrition therapy for other medical conditions (including diabetes).  Achieve this pattern by following plans such as the [place] Mediterranean, DASH dietary pattern, or AHA diet.     Engage in 2 hours and 30 minutes per week of moderate-intensity physical activity, or 1 hour and 15 minutes (75 minutes) per week of vigorous-intensity aerobic physical activity, or an equivalent combination of moderate and vigorous-intensity aerobic physical activity. Aerobic activity should be performed in episodes of at least 10 minutes preferably spread throughout the week.     Adhering to a heart healthy diet, regular exercise habits, avoidance of tobacco products, and maintenance of a healthy  weight are crucial components of their heart disease risk reduction.     Any positive review of systems not specifically addressed in the office visit today should be evaluated and treated by the patients primary care physician or in an emergency department if necessary     Patient was notified that results from ordered tests will be called to the patient if it changes current management; it will otherwise be discussed at a future appointment and available on  [employer].     Thank you for allowing me to participate in the care of this patient.        This document was generated using the assistance of voice recognition software. If there are any errors of spelling, grammar, syntax, or meaning; please feel free to contact me directly for clarification.    Past Medical History:  He has a past medical history of Disturbances of salivary secretion, Other conditions influencing health status, Personal history of diseases of the skin and subcutaneous tissue, Personal history of other diseases of the circulatory system, and Raynaud's syndrome without gangrene.    Past Surgical History:  He has a past surgical history that includes Other surgical history (04/05/2019).      Social History:  He reports that he has never smoked. He has never used smokeless tobacco. He reports that he does not drink alcohol and does not use drugs.    Family History:  No family history on file.     Allergies:  Augmentin [amoxicillin-pot clavulanate], Cephalexin, and Penicillins    Outpatient Medications:  Current Outpatient Medications   Medication Instructions    albuterol (ProAir HFA) 90 mcg/actuation inhaler 2 puffs, inhalation, Every 4 hours PRN    aspirin 81 mg EC tablet Take by mouth.    atorvastatin (Lipitor) 20 mg tablet Take 1 tablet (20 mg) by mouth daily at bedtime    cholecalciferol (Vitamin D-3) 25 MCG (1000 UT) capsule 1 capsule, Daily    desipramine (NORPRAMIN) 25 mg    furosemide (Lasix) 20 mg tablet TAKE 1 TABLET BY MOUTH ONCE  "DAILY    inhalational spacing device inhaler Use as directed with inhalers    metoprolol succinate XL (Toprol-XL) 25 mg 24 hr tablet TAKE 1 TABLET BY MOUTH TWO TIMES A DAY    multivitamin (Daily Multi-Vitamin) tablet Daily RT    risperiDONE (RISPERDAL) 0.5 mg, 2 times daily    sacubitriL-valsartan (Entresto) 24-26 mg tablet TAKE 1 TABLET BY MOUTH TWICE A DAY    sennosides-docusate sodium (Linda-Colace) 8.6-50 mg tablet 1 tablet, oral, Daily    spironolactone (ALDACTONE) 12.5 mg, oral, Daily        ROS:  A 14 point review of systems was done and is negative other than as stated in HPI    Vitals:      2/15/2024     3:56 PM 6/10/2024     8:08 AM 7/24/2024     3:53 PM 1/29/2025     3:46 PM 2/18/2025     1:35 PM 6/16/2025     1:28 PM 7/1/2025    11:25 AM   Vitals   Systolic 115 116 130 112 128 132 128   Diastolic 71 78 86 70 84 70 76   Heart Rate 88 84 91 91 90 100 83   Temp 36.9 °C (98.4 °F) 36.7 °C (98 °F)   36.5 °C (97.7 °F) 36.4 °C (97.5 °F) 36.3 °C (97.4 °F)   Resp 16         Height 1.676 m (5' 6\") 1.676 m (5' 6\") 1.676 m (5' 6\") 1.676 m (5' 6\")  1.626 m (5' 4\")    Weight (lb) 206 214 210 219  213.8 215   BMI 33.25 kg/m2 34.54 kg/m2 33.89 kg/m2 35.35 kg/m2  36.7 kg/m2 36.9 kg/m2   BSA (m2) 2.09 m2 2.13 m2 2.11 m2 2.15 m2  2.09 m2 2.1 m2        Physical Exam:   Constitutional: Cooperative, in no acute distress, alert, appears stated age.   Skin: Skin color, texture, turgor normal. No rashes or lesions.   Head: Normocephalic. No masses, lesions, tenderness or abnormalities   Eyes: Extraocular movements are grossly intact.   Mouth and throat: Mucous membranes moist   Neck: Neck supple, no carotid bruits, no JVD   Respiratory: Lungs clear to auscultation, no wheezing or rhonchi, no use of accessory muscles   Chest wall: No scars, normal excursion with respiration   Cardiovascular: Regular rhythm without murmur, gallop, or rubs   Gastrointestinal: Abdomen soft, nontender. Bowel sounds normal. Obese.  Musculoskeletal: " Strength equal in upper extremities   Extremities: No pitting edema   Neurologic: Sensation grossly intact, alert and oriented x3        Intake/Output:   No intake/output data recorded.    Outpatient Medications  Current Outpatient Medications on File Prior to Visit   Medication Sig Dispense Refill    albuterol (ProAir HFA) 90 mcg/actuation inhaler Inhale 2 puffs every 4 hours if needed for wheezing or shortness of breath. 8.5 g 5    aspirin 81 mg EC tablet Take by mouth.      atorvastatin (Lipitor) 20 mg tablet Take 1 tablet (20 mg) by mouth daily at bedtime 90 tablet 3    cholecalciferol (Vitamin D-3) 25 MCG (1000 UT) capsule Take 1 capsule (25 mcg) by mouth once daily.      desipramine (Norpramin) 25 mg tablet Take 1 tablet (25 mg) by mouth.      [] doxycycline (Vibramycin) 100 mg capsule Take 1 capsule (100 mg) by mouth 2 times a day for 7 days. Take with at least 8 ounces (large glass) of water, do not lie down for 30 minutes after 14 capsule 0    furosemide (Lasix) 20 mg tablet TAKE 1 TABLET BY MOUTH ONCE DAILY 90 tablet 1    inhalational spacing device inhaler Use as directed with inhalers 1 each 0    metoprolol succinate XL (Toprol-XL) 25 mg 24 hr tablet TAKE 1 TABLET BY MOUTH TWO TIMES A  tablet 3    multivitamin (Daily Multi-Vitamin) tablet Take by mouth once daily.      risperiDONE (RisperDAL) 0.5 mg tablet Take 1 tablet (0.5 mg) by mouth 2 times a day.      sacubitriL-valsartan (Entresto) 24-26 mg tablet TAKE 1 TABLET BY MOUTH TWICE A  tablet 3    sennosides-docusate sodium (Linda-Colace) 8.6-50 mg tablet Take 1 tablet by mouth once daily. 90 tablet 3    spironolactone (Aldactone) 25 mg tablet Take 0.5 tablets (12.5 mg) by mouth once daily. 45 tablet 1     No current facility-administered medications on file prior to visit.       Labs: (past 26 weeks)  Recent Results (from the past 26 weeks)   Basic Metabolic Panel    Collection Time: 01/15/25  1:11 PM   Result Value Ref Range     Glucose 87 74 - 99 mg/dL    Sodium 138 136 - 145 mmol/L    Potassium 4.0 3.5 - 5.3 mmol/L    Chloride 99 98 - 107 mmol/L    Bicarbonate 31 21 - 32 mmol/L    Anion Gap 12 10 - 20 mmol/L    Urea Nitrogen 14 6 - 23 mg/dL    Creatinine 1.07 0.50 - 1.30 mg/dL    eGFR 88 >60 mL/min/1.73m*2    Calcium 9.8 8.6 - 10.3 mg/dL   Magnesium    Collection Time: 01/15/25  1:11 PM   Result Value Ref Range    Magnesium 1.95 1.60 - 2.40 mg/dL   B-Type Natriuretic Peptide    Collection Time: 01/15/25  1:11 PM   Result Value Ref Range    BNP 10 0 - 99 pg/mL   Transthoracic Echo (TTE) Complete    Collection Time: 01/24/25 12:48 PM   Result Value Ref Range    LVOT diam 2.11 cm    LV Biplane EF 51 %    MV E/A ratio 1.11     MV avg E/e' ratio 4.61     Tricuspid annular plane systolic excursion 2.0 cm    LA vol index A/L 21.2 ml/m2    LV EF 43 %    RV free wall pk S' 18.68 cm/s    LVIDd 5.79 cm    LV A4C EF 62.6    ECG 12 lead (Clinic Performed)    Collection Time: 01/29/25  4:00 PM   Result Value Ref Range    Ventricular Rate 91 BPM    Atrial Rate 91 BPM    CA Interval 152 ms    QRS Duration 106 ms    QT Interval 358 ms    QTC Calculation(Bazett) 440 ms    P Axis 53 degrees    R Axis 67 degrees    T Axis -31 degrees    QRS Count 15 beats    Q Onset 224 ms    P Onset 148 ms    P Offset 200 ms    T Offset 403 ms    QTC Fredericia 411 ms       ECG  Encounter Date: 01/29/25   ECG 12 lead (Clinic Performed)   Result Value    Ventricular Rate 91    Atrial Rate 91    CA Interval 152    QRS Duration 106    QT Interval 358    QTC Calculation(Bazett) 440    P Axis 53    R Axis 67    T Axis -31    QRS Count 15    Q Onset 224    P Onset 148    P Offset 200    T Offset 403    QTC Fredericia 411    Narrative    Normal sinus rhythm  ST & T wave abnormality, consider inferior ischemia  Abnormal ECG  When compared with ECG of 31-AUG-2022 09:34,  PREVIOUS ECG IS PRESENT  Confirmed by Rajesh Kumari ([id number]) on 1/29/2025 5:16:26 PM       Echocardiogram  No results  found for this or any previous visit from the past 1095 days.      CV Studies:  EKG:  Encounter Date: 01/29/25   ECG 12 lead (Clinic Performed)   Result Value    Ventricular Rate 91    Atrial Rate 91    OR Interval 152    QRS Duration 106    QT Interval 358    QTC Calculation(Bazett) 440    P Axis 53    R Axis 67    T Axis -31    QRS Count 15    Q Onset 224    P Onset 148    P Offset 200    T Offset 403    QTC Fredericia 411    Narrative    Normal sinus rhythm  ST & T wave abnormality, consider inferior ischemia  Abnormal ECG  When compared with ECG of 31-AUG-2022 09:34,  PREVIOUS ECG IS PRESENT  Confirmed by Benigno Cross [id number]) on 1/29/2025 5:16:26 PM     Echocardiogram:   [place] [address]  Phone [phone] Fax [phone]    TRANSTHORACIC ECHOCARDIOGRAM REPORT      Patient Name:     JAVIER RIOS  Reading Physician:   [id number] Anthony Herman MD  Study Date:       10/7/2022    Referring Physician: [id number] BENIGNO CROSS  MRN/PID:          [id number]     PCP:  Accession/Order#: [id number] Department Location: [facility] Echo Lab  YOB: 1980    Fellow:  Gender:           M            Nurse:  Admit Date:                    Sonographer:         Brigid Fischer [facility]  Admission Status: Outpatient   Additional Staff:  Height:           162.56 cm    CC Report to:  Weight:           92.53 kg     Study Type:          Echocardiogram  BSA:              1.97 m2  Blood Pressure: 118 /74 mmHg    Diagnosis/ICD: I50.40-Unspecified combined systolic (congestive) and diastolic  (congestive) heart failure (CHF); I25.10-Atherosclerotic heart  disease of native coronary artery without angina pectoris  Indication:    CAD, CHF  Procedure/CPT: Echo Complete w Full [id number]    Patient History:  Pertinent History: CAD, CHF and Dyslipidemia.    Study Detail: The following Echo studies were performed: 2D, M-Mode, Doppler and  color flow. Technically  challenging study due to body habitus.      PHYSICIAN INTERPRETATION:  Left Ventricle: Left ventricular systolic function is moderately decreased, with an estimated ejection fraction of 35-40%. There is global hypokinesis of the left ventricle with minor regional variations. The left ventricular cavity size is normal. There is moderate concentric left ventricular hypertrophy. Spectral Doppler shows a restrictive pattern of left ventricular diastolic filling.  Left Atrium: The left atrium is normal in size.  Right Ventricle: The right ventricle is upper limits of normal in size. There is mildly reduced right ventricular systolic function.  Right Atrium: The right atrium is normal in size.  Aortic Valve: The aortic valve appears structurally normal. There is no evidence of aortic valve regurgitation.  Mitral Valve: The mitral valve is normal in structure. There is no evidence of mitral valve regurgitation.  Tricuspid Valve: The tricuspid valve is structurally normal. There is mild tricuspid regurgitation.  Pulmonic Valve: The pulmonic valve is structurally normal. There is no indication of pulmonic valve regurgitation.  Pericardium: There is no pericardial effusion noted.  Aorta: The aortic root is normal.      CONCLUSIONS:  1. Left ventricular systolic function is moderately decreased with a 35-40% estimated ejection fraction.  2. Spectral Doppler shows a restrictive pattern of left ventricular diastolic filling.  3. There is moderate concentric left ventricular hypertrophy.  4. There is mildly reduced right ventricular systolic function.  5. There is global hypokinesis of the left ventricle with minor regional variations.    QUANTITATIVE DATA SUMMARY:  2D MEASUREMENTS:  Normal Ranges:  Ao Root d:     3.30 cm    (2.0-3.7cm)  IVSd:          1.40 cm    (0.6-1.1cm)  LVPWd:         1.40 cm    (0.6-1.1cm)  LVIDd:         6.30 cm    (3.9-5.9cm)  LVIDs:         5.70 cm  LV Mass Index: 212.7 g/m2  LV % FS        9.5 %    LA  VOLUME:  Normal Ranges:  LA Vol A4C:        41.6 ml    (22+/-6mL/m2)  LA Vol A2C:        41.6 ml  LA Vol BP:         41.6 ml  LA Vol Index A4C:  21.1ml/m2  LA Vol Index A2C:  21.1 ml/m2  LA Vol Index BP:   21.1 ml/m2  LA Area A4C:       14.0 cm2  LA Area A2C:       14.0 cm2  LA Major Axis A4C: 4.0 cm  LA Major Axis A2C: 4.0 cm  LA Volume Index:   21.0 ml/m2    RA VOLUME BY A/L METHOD:  Normal Ranges:  RA Area A4C: 10.0 cm2    M-MODE MEASUREMENTS:  Normal Ranges:  Ao Root: 3.20 cm (2.0-3.7cm)  AoV Exc: 2.40 cm (1.5-2.5cm)  LAs:     3.30 cm (2.7-4.0cm)    AORTA MEASUREMENTS:  Normal Ranges:  AoV Exc:   2.40 cm (1.5-2.5cm)  Asc Ao, d: 2.50 cm (2.1-3.4cm)    LV SYSTOLIC FUNCTION BY 2D PLANIMETRY (MOD):  Normal Ranges:  EF-A4C View: 40.9 % (>55%)  EF-A2C View: 33.8 %  EF-Biplane:  37.9 %    LV DIASTOLIC FUNCTION:  Normal Ranges:  MV Peak E:    0.76 m/s   (0.7-1.2 m/s)  MV Peak A:    0.40 m/s   (0.42-0.7 m/s)  E/A Ratio:    1.86       (1.0-2.2)  MV e'         0.08 m/s   (>8.0)  MV lateral e' 0.10 m/s  MV medial e'  0.07 m/s  MV A Dur:     81.00 msec  E/e' Ratio:   8.88       (<8.0)  LV IVRT:      109 msec   (<100ms)    MITRAL VALVE:  Normal Ranges:  MV DT: 165 msec (150-240msec)    AORTIC VALVE:  Normal Ranges:  LVOT Max Andrea:  0.95 m/s (<1.1m/s)  LVOT VTI:      16.20 cm  LVOT Diameter: 2.30 cm  (1.8-2.4cm)    RIGHT VENTRICLE:  RV 1   3.7 cm  RV 2   3.1 cm  RV 3   7.7 cm  TAPSE: 20.0 mm  RV s'  0.14 m/s    TRICUSPID VALVE/RVSP:  Normal Ranges:  Peak TR Velocity: 2.20 m/s  Est. RA Pressure: 3 mmHg  RV Syst Pressure: 22.4 mmHg (< 30mmHg)  TV E Vmax:        0.55 m/s  (0.3-0.7m/s)  IVC Diam:         0.90 cm    PULMONIC VALVE:  Normal Ranges:  PIEDV: 1.17 m/s  PADP:  8.5 mmHg      [id number] Anthony Herman MD  Electronically signed on 10/7/2022 at 6:49:31 PM         Final     Stress Testing IMESULT(YXG2880:1:1825): No results found for this or any previous visit from the past 1825 days.    Cardiac Catheterization:   Adult Cath      [facility], Cath Lab  [address] [place] [address]  Phone [phone] Fax [phone]    Cardiovascular Catheterization Report    Patient Name:     JAVIER RIOS Performing Physician: [id number] Donavon Manning MD  Study Date:       6/9/2022    Verifying Physician:  [id number] Donavon Manning MD  MRN/PID:          [id number]    Cardiologist:  Accession/Order#: [id number]   Referring Physician:  [id number] BENIGNO CROSS  YOB: 1980   Referring Physician:  Gender:           M           Referring Physician:      Study: Left and Right Heart Catheterization      Indications:  JAVIER RIOS is a 42 year old male who presents with chronic pulmonary disease. Cardiomyopathy and left ventricular dysfunction, with an asymptomatic chest pain assessment. Study performed as an urgent cath procedure. Heart failure.    Medical History:  Stress test performed: No. CTA performed: No. Samuel accessed: No. LVEF Assessed: Yes. LVEF = 25-30%%.    Procedure Description:  After infiltration with 2% Lidocaine, the right femoral artery was cannulated with a modified Seldinger technique. Subsequently a 6 Vietnamese sheath was placed in the right femoral artery. After infiltration of local anesthetic, the right femoral vein was cannulated with a micropuncture technique. A 7 Vietnamese sheath was placed in the vein. Selective coronary catheterization was performed using a 6 Fr catheter(s) exchanged over a guide wire to cannulate the coronary arteries. A JL 4 tip catheter was used for left coronary injections. A JR 4 tip catheter was used for right coronary injections.  Multiple injections of contrast were made into the left and right coronary arteries with angiograms recorded in multiple projections. A balloon tipped catheter was advanced through the right heart to record pressures. Cardiac output was calculated via the Kailey method. After completion of the procedure, femoral artery angiography was performed. This  demonstrated a common femoral artery puncture appropriate for closure. An Angio-Seal Evolution 6F ([employer]) vascular closure device was placed per protocol. Post-procedure, the venous sheath was pulled and pressure was applied to the site.    Coronary Angiography:  The coronary circulation is right dominant.    Left Main Coronary Artery:  The left main coronary artery is a large caliber vessel. The left main arises normally from the left coronary sinus of Valsalva, bifurcates into the LAD and circumflex coronary arteries and gives rise to the ramus intermedius artery. The left main coronary artery showed mild luminal irregularities.    Left Anterior Descending Coronary Artery Distribution:  The left anterior descending coronary artery is a large caliber vessel. The LAD arises normally from the left main coronary artery and wraps around the apex of the LV. The LAD demonstrated mild distal atherosclerotic disease. The 1st diagonal branch is a normal caliber vessel. The 1st diagonal branch showed no significant disease or stenosis greater than 30%. The 2nd diagonal branch is a small caliber vessel. The 2nd diagonal branch demonstrated no significant disease or stenosis greater than 30%. The 3rd diagonal branch is a normal caliber vessel. The 3rd diagonal branch revealed no significant disease or stenosis greater than 30%.    Circumflex Coronary Artery Distribution:  The circumflex coronary artery is a large caliber vessel. The circumflex arises normally from the left main coronary artery, giving rise to the first obtuse marginal, second obtuse marginal and third obtuse marginal. The circumflex revealed mild luminal irregularities. The 1st obtuse marginal branch is a normal caliber vessel. The ostial 1st obtuse marginal branch showed 30% stenosis. This lesion was focal. The 2nd obtuse marginal branch is a medium-sized caliber vessel. The 2nd obtuse marginal branch demonstrated mild atherosclerotic  disease.    Ramus Intermedius:  The ramus intermedius is a large caliber vessel. The ramus intermedius arises normally from the left main coronary artery. The ramus intermedius showed no significant disease or stenosis greater than 30%.    Right Heart Catheterization:  A balloon tipped catheter was advanced through the right heart to record pressures. Cardiac output was calculated via the Kailey method. Elevated left sided filling pressures with low cardiac output. Cardiac output is moderately decreased. No evidence of shunt. Cardiogenic shock. Pulmonary venous hypertension. Elevated right and left sided filling pressures, depressed Kailey CO/CI.  RA mean 14, PA 55/32/42, PCWP 35, LVEDP 38.    Right Coronary Artery Distribution:    The right coronary artery is a large caliber vessel. The RCA arises normally from the right sinus of Valsalva, supplies the right posterolateral descending artery and supplies the posterolateral system. The RCA showed diffuse mild atherosclerotic disease.    Coronary Lesion Summary:  Vessel   Stenosis   Vessel Segment  OM 1   30% stenosis     ostial        Complications:  No in-lab complications observed.    Cardiac Cath Transition of Care Summary:  Post Procedure           Mild CAD.  Diagnosis:  Blood Loss:              Estimated blood loss during the procedure was minimal  mls.  Specimens Removed:       Number of specimen(s) removed: for sats.      Recommendations:  Maximize medical therapy.  Agressive risk factor modification efforts.  Telemetry monitoring.  Follow-up with cardiology clinic.  Monitor vitals and arterial access site/pulses.  Lipid lowering agent or Statin therapy.  Discussed RHC findings with referring physician Dr. Kumari, plan for diuresis,  optimal medical therapy for heart failure, and short course of inotropic  therapy.  Medical management of coronary artery disease.  Aspirin  therapy.    ____________________________________________________________________________________  CONCLUSIONS:  1. Mild nonobstructive CAD.  2. Elevated right and left sided filling pressures, depressed Kailey CO/CI.  RA mean 14, PA 55/32/42, PCWP 35, LVEDP 37.    ____________________________________________________________________________________  CPT Codes:  Right & Left Heart Cath w/ventriculography/Coronary angio (RHC)([facility])-99910; Moderate Sedation Services initial 15 minutes patient >5 years-[id number]    ICD 10 Codes:  I42.0-Dilated cardiomyopathy; I50.21-Acute systolic (congestive) heart failure    [id number] Donavon Manning MD  Performing Physician  Electronically signed by [id number] Donavon Manning MD on 6/15/2022 at 11:23:00 AM      cc Report to: [id number] BENIGNO KUMARI           Final   No results found for this or any previous visit from the past 3650 days.     Cardiac Scoring: No results found for this or any previous visit from the past 1825 days.    AAA : No results found for this or any previous visit from the past 1825 days.    OTHER: No results found for this or any previous visit from the past 1825 days.    LAST IMAGING RESULTS  ECG 12 lead (Clinic Performed)  Normal sinus rhythm  ST & T wave abnormality, consider inferior ischemia  Abnormal ECG  When compared with ECG of 31-AUG-2022 09:34,  PREVIOUS ECG IS PRESENT  Confirmed by Benigno Kumari ([id number]) on 1/29/2025 5:16:26 PM      Problem List Items Addressed This Visit       Hypertension    Hyperlipemia    Coronary artery disease involving native coronary artery of native heart    Developmental disability    LEIDA on CPAP    Obesity (BMI 30-39.9)    Chronic heart failure with mildly reduced ejection fraction (HFmrEF, 41-49%) - Primary        Benigno Kumari DO, EMY, FACOI

## 2025-07-14 ENCOUNTER — OFFICE VISIT (OUTPATIENT)
Dept: SURGERY | Facility: CLINIC | Age: 45
End: 2025-07-14
Payer: MEDICARE

## 2025-07-14 VITALS
WEIGHT: 219.6 LBS | DIASTOLIC BLOOD PRESSURE: 80 MMHG | BODY MASS INDEX: 37.49 KG/M2 | SYSTOLIC BLOOD PRESSURE: 126 MMHG | HEIGHT: 64 IN | HEART RATE: 78 BPM

## 2025-07-14 DIAGNOSIS — L72.3 SEBACEOUS CYST: ICD-10-CM

## 2025-07-14 PROCEDURE — 3079F DIAST BP 80-89 MM HG: CPT | Performed by: SURGERY

## 2025-07-14 PROCEDURE — 99202 OFFICE O/P NEW SF 15 MIN: CPT | Performed by: SURGERY

## 2025-07-14 PROCEDURE — 3074F SYST BP LT 130 MM HG: CPT | Performed by: SURGERY

## 2025-07-14 PROCEDURE — 1036F TOBACCO NON-USER: CPT | Performed by: SURGERY

## 2025-07-14 PROCEDURE — 3008F BODY MASS INDEX DOCD: CPT | Performed by: SURGERY

## 2025-07-14 PROCEDURE — 99212 OFFICE O/P EST SF 10 MIN: CPT | Performed by: SURGERY

## 2025-07-14 ASSESSMENT — ENCOUNTER SYMPTOMS
LOSS OF SENSATION IN FEET: 0
DEPRESSION: 0
OCCASIONAL FEELINGS OF UNSTEADINESS: 0

## 2025-07-14 ASSESSMENT — PAIN SCALES - GENERAL: PAINLEVEL_OUTOF10: 0-NO PAIN

## 2025-07-14 NOTE — PROGRESS NOTES
Subjective   Patient ID: Zhang Alvarado is a 45 y.o. male who presents for Abscess.  HPI  Is a 45-year-old gentleman with history of developmental disability who comes in with his parents for evaluation and treatment of a infected epidermoid cyst involving his back.  His mother states that roughly 1 month ago the cyst became quite inflamed and swell to the size of a $0.50 piece.  He responded nicely to oral antibiotics.  At this point it is difficult for her to find the lesion     Past Medical History:   Diagnosis Date    Disturbances of salivary secretion     Hypersalivation    Other conditions influencing health status     Behavior disorder    Personal history of diseases of the skin and subcutaneous tissue     History of pilonidal cyst    Personal history of other diseases of the circulatory system     History of congestive cardiomyopathy    Raynaud's syndrome without gangrene     Raynaud phenomenon        Past Surgical History:  Procedure Laterality Date    OTHER SURGICAL HISTORY  04/05/2019    Complete colonoscopy            Objective     Physical Exam    Mildly obese well-developed. In no distress  Alert and oriented x 3  Lungs are clear to auscultation bilaterally.  Cardiac exam is regular rhythm and rate.  Abdomen is soft nontender nondistended.  Neurologic exam is without focal deficit.  Mom points out where cyst was in the upper 1/3 back, right of midline.  Only very mild induration.  No palpable lesion.  No erythema or other signs of infection   Assessment/Plan        Impression:  at one point patient noticed infected epidermoid cyst involving the upper back.  Has resolved nicely with antibiotics.  No lesion noted on exam that would require drainage or excision at this point.  I have asked him to follow-up with me as needed

## 2025-07-18 DIAGNOSIS — G47.33 OSA ON CPAP: ICD-10-CM

## 2025-07-18 DIAGNOSIS — I50.42 CHRONIC COMBINED SYSTOLIC AND DIASTOLIC HEART FAILURE: ICD-10-CM

## 2025-07-18 DIAGNOSIS — F89 DEVELOPMENTAL DISABILITY: ICD-10-CM

## 2025-07-18 DIAGNOSIS — I50.22 CHRONIC HEART FAILURE WITH MILDLY REDUCED EJECTION FRACTION (HFMREF, 41-49%): ICD-10-CM

## 2025-07-18 DIAGNOSIS — I25.10 CORONARY ARTERY DISEASE INVOLVING NATIVE CORONARY ARTERY OF NATIVE HEART, UNSPECIFIED WHETHER ANGINA PRESENT: ICD-10-CM

## 2025-07-18 DIAGNOSIS — G47.33 OSA (OBSTRUCTIVE SLEEP APNEA): ICD-10-CM

## 2025-07-18 DIAGNOSIS — I25.10 CORONARY ARTERY DISEASE INVOLVING NATIVE CORONARY ARTERY OF NATIVE HEART WITHOUT ANGINA PECTORIS: ICD-10-CM

## 2025-07-18 DIAGNOSIS — E78.2 MIXED HYPERLIPIDEMIA: ICD-10-CM

## 2025-07-18 DIAGNOSIS — I10 PRIMARY HYPERTENSION: ICD-10-CM

## 2025-07-18 DIAGNOSIS — E66.9 OBESITY (BMI 30-39.9): ICD-10-CM

## 2025-07-24 LAB
ALBUMIN SERPL-MCNC: 4.5 G/DL (ref 3.6–5.1)
ALP SERPL-CCNC: 43 U/L (ref 36–130)
ALT SERPL-CCNC: 29 U/L (ref 9–46)
ANION GAP SERPL CALCULATED.4IONS-SCNC: 9 MMOL/L (CALC) (ref 7–17)
AST SERPL-CCNC: 20 U/L (ref 10–40)
BILIRUB SERPL-MCNC: 0.5 MG/DL (ref 0.2–1.2)
BNP SERPL-MCNC: <4 PG/ML
BUN SERPL-MCNC: 12 MG/DL (ref 7–25)
CALCIUM SERPL-MCNC: 9.2 MG/DL (ref 8.6–10.3)
CHLORIDE SERPL-SCNC: 103 MMOL/L (ref 98–110)
CHOLEST SERPL-MCNC: 138 MG/DL
CHOLEST/HDLC SERPL: 3.1 (CALC)
CO2 SERPL-SCNC: 29 MMOL/L (ref 20–32)
CREAT SERPL-MCNC: 1.09 MG/DL (ref 0.6–1.29)
EGFRCR SERPLBLD CKD-EPI 2021: 86 ML/MIN/1.73M2
ERYTHROCYTE [DISTWIDTH] IN BLOOD BY AUTOMATED COUNT: 15.4 % (ref 11–15)
GLUCOSE SERPL-MCNC: 93 MG/DL (ref 65–99)
HCT VFR BLD AUTO: 47 % (ref 38.5–50)
HDLC SERPL-MCNC: 45 MG/DL
HGB BLD-MCNC: 14.7 G/DL (ref 13.2–17.1)
LDLC SERPL CALC-MCNC: 68 MG/DL (CALC)
MAGNESIUM SERPL-MCNC: 2.1 MG/DL (ref 1.5–2.5)
MCH RBC QN AUTO: 29.5 PG (ref 27–33)
MCHC RBC AUTO-ENTMCNC: 31.3 G/DL (ref 32–36)
MCV RBC AUTO: 94.2 FL (ref 80–100)
NONHDLC SERPL-MCNC: 93 MG/DL (CALC)
PLATELET # BLD AUTO: 199 THOUSAND/UL (ref 140–400)
PMV BLD REES-ECKER: 8.6 FL (ref 7.5–12.5)
POTASSIUM SERPL-SCNC: 4.3 MMOL/L (ref 3.5–5.3)
PROT SERPL-MCNC: 7 G/DL (ref 6.1–8.1)
RBC # BLD AUTO: 4.99 MILLION/UL (ref 4.2–5.8)
SODIUM SERPL-SCNC: 141 MMOL/L (ref 135–146)
TRIGL SERPL-MCNC: 174 MG/DL
WBC # BLD AUTO: 5.9 THOUSAND/UL (ref 3.8–10.8)

## 2025-07-30 ENCOUNTER — OFFICE VISIT (OUTPATIENT)
Dept: CARDIOLOGY | Facility: HOSPITAL | Age: 45
End: 2025-07-30
Payer: MEDICARE

## 2025-07-30 VITALS
HEART RATE: 94 BPM | SYSTOLIC BLOOD PRESSURE: 122 MMHG | BODY MASS INDEX: 37.39 KG/M2 | DIASTOLIC BLOOD PRESSURE: 86 MMHG | WEIGHT: 219 LBS | HEIGHT: 64 IN

## 2025-07-30 DIAGNOSIS — I25.10 CORONARY ARTERY DISEASE INVOLVING NATIVE CORONARY ARTERY OF NATIVE HEART WITHOUT ANGINA PECTORIS: ICD-10-CM

## 2025-07-30 DIAGNOSIS — E66.9 OBESITY (BMI 30-39.9): ICD-10-CM

## 2025-07-30 DIAGNOSIS — I10 PRIMARY HYPERTENSION: ICD-10-CM

## 2025-07-30 DIAGNOSIS — F89 DEVELOPMENTAL DISABILITY: ICD-10-CM

## 2025-07-30 DIAGNOSIS — E78.2 MIXED HYPERLIPIDEMIA: ICD-10-CM

## 2025-07-30 DIAGNOSIS — G47.33 OSA ON CPAP: ICD-10-CM

## 2025-07-30 DIAGNOSIS — I50.22 CHRONIC HEART FAILURE WITH MILDLY REDUCED EJECTION FRACTION (HFMREF, 41-49%): Primary | ICD-10-CM

## 2025-07-30 LAB
ATRIAL RATE: 94 BPM
P AXIS: 42 DEGREES
P OFFSET: 184 MS
P ONSET: 131 MS
PR INTERVAL: 158 MS
Q ONSET: 210 MS
QRS COUNT: 16 BEATS
QRS DURATION: 106 MS
QT INTERVAL: 360 MS
QTC CALCULATION(BAZETT): 450 MS
QTC FREDERICIA: 418 MS
R AXIS: 16 DEGREES
T AXIS: 17 DEGREES
T OFFSET: 390 MS
VENTRICULAR RATE: 94 BPM

## 2025-07-30 PROCEDURE — 93010 ELECTROCARDIOGRAM REPORT: CPT | Performed by: INTERNAL MEDICINE

## 2025-07-30 PROCEDURE — 3008F BODY MASS INDEX DOCD: CPT | Performed by: INTERNAL MEDICINE

## 2025-07-30 PROCEDURE — 99212 OFFICE O/P EST SF 10 MIN: CPT | Mod: 25 | Performed by: INTERNAL MEDICINE

## 2025-07-30 PROCEDURE — 93005 ELECTROCARDIOGRAM TRACING: CPT | Performed by: INTERNAL MEDICINE

## 2025-07-30 PROCEDURE — 3074F SYST BP LT 130 MM HG: CPT | Performed by: INTERNAL MEDICINE

## 2025-07-30 PROCEDURE — 3079F DIAST BP 80-89 MM HG: CPT | Performed by: INTERNAL MEDICINE

## 2025-07-30 PROCEDURE — 1036F TOBACCO NON-USER: CPT | Performed by: INTERNAL MEDICINE

## 2025-07-30 PROCEDURE — 99214 OFFICE O/P EST MOD 30 MIN: CPT | Performed by: INTERNAL MEDICINE

## 2025-08-23 DIAGNOSIS — I50.42 CHRONIC COMBINED SYSTOLIC AND DIASTOLIC HEART FAILURE: ICD-10-CM

## 2025-08-25 RX ORDER — SPIRONOLACTONE 25 MG/1
12.5 TABLET ORAL DAILY
Qty: 45 TABLET | Refills: 3 | Status: SHIPPED | OUTPATIENT
Start: 2025-08-25

## 2025-08-26 PROCEDURE — RXMED WILLOW AMBULATORY MEDICATION CHARGE

## 2025-08-27 ENCOUNTER — PHARMACY VISIT (OUTPATIENT)
Dept: PHARMACY | Facility: CLINIC | Age: 45
End: 2025-08-27
Payer: COMMERCIAL

## 2025-09-03 DIAGNOSIS — I50.42 CHRONIC COMBINED SYSTOLIC AND DIASTOLIC HEART FAILURE: ICD-10-CM

## 2025-09-03 RX ORDER — SACUBITRIL AND VALSARTAN 24; 26 MG/1; MG/1
1 TABLET ORAL 2 TIMES DAILY
Qty: 180 TABLET | Refills: 3 | Status: CANCELLED | OUTPATIENT
Start: 2025-09-03 | End: 2026-09-03

## 2025-09-04 ENCOUNTER — OFFICE VISIT (OUTPATIENT)
Dept: SLEEP MEDICINE | Facility: CLINIC | Age: 45
End: 2025-09-04
Payer: MEDICARE

## 2025-09-04 VITALS — HEIGHT: 64 IN | BODY MASS INDEX: 36.7 KG/M2 | WEIGHT: 215 LBS

## 2025-09-04 DIAGNOSIS — G47.33 OSA ON CPAP: Primary | ICD-10-CM

## 2025-09-04 DIAGNOSIS — E66.9 OBESITY (BMI 30-39.9): ICD-10-CM

## 2025-09-04 DIAGNOSIS — I50.42 CHRONIC COMBINED SYSTOLIC AND DIASTOLIC HEART FAILURE: ICD-10-CM

## 2025-09-04 PROCEDURE — 1036F TOBACCO NON-USER: CPT | Performed by: INTERNAL MEDICINE

## 2025-09-04 PROCEDURE — 99214 OFFICE O/P EST MOD 30 MIN: CPT | Performed by: INTERNAL MEDICINE

## 2025-09-04 PROCEDURE — 3008F BODY MASS INDEX DOCD: CPT | Performed by: INTERNAL MEDICINE

## 2025-09-04 PROCEDURE — 99214 OFFICE O/P EST MOD 30 MIN: CPT | Mod: GT,95 | Performed by: INTERNAL MEDICINE

## 2025-09-04 ASSESSMENT — SLEEP AND FATIGUE QUESTIONNAIRES
HOW LIKELY ARE YOU TO NOD OFF OR FALL ASLEEP WHILE WATCHING TV: HIGH CHANCE OF DOZING
HOW LIKELY ARE YOU TO NOD OFF OR FALL ASLEEP IN A CAR, WHILE STOPPED FOR A FEW MINUTES IN TRAFFIC: SLIGHT CHANCE OF DOZING
HOW LIKELY ARE YOU TO NOD OFF OR FALL ASLEEP WHEN YOU ARE A PASSENGER IN A CAR FOR AN HOUR WITHOUT A BREAK: HIGH CHANCE OF DOZING
HOW LIKELY ARE YOU TO NOD OFF OR FALL ASLEEP WHILE LYING DOWN TO REST IN THE AFTERNOON WHEN CIRCUMSTANCES PERMIT: HIGH CHANCE OF DOZING
HOW LIKELY ARE YOU TO NOD OFF OR FALL ASLEEP WHILE SITTING INACTIVE IN A PUBLIC PLACE: SLIGHT CHANCE OF DOZING
HOW LIKELY ARE YOU TO NOD OFF OR FALL ASLEEP WHILE SITTING QUIETLY AFTER LUNCH WITHOUT ALCOHOL: HIGH CHANCE OF DOZING
HOW LIKELY ARE YOU TO NOD OFF OR FALL ASLEEP WHILE SITTING AND READING: HIGH CHANCE OF DOZING
HOW LIKELY ARE YOU TO NOD OFF OR FALL ASLEEP WHILE WATCHING TV: HIGH CHANCE OF DOZING
HOW LIKELY ARE YOU TO NOD OFF OR FALL ASLEEP WHILE LYING DOWN TO REST IN THE AFTERNOON WHEN CIRCUMSTANCES PERMIT: HIGH CHANCE OF DOZING
ESS TOTAL SCORE: 17
HOW LIKELY ARE YOU TO NOD OFF OR FALL ASLEEP WHILE SITTING AND TALKING TO SOMEONE: WOULD NEVER DOZE
HOW LIKELY ARE YOU TO NOD OFF OR FALL ASLEEP WHILE SITTING AND READING: HIGH CHANCE OF DOZING
HOW LIKELY ARE YOU TO NOD OFF OR FALL ASLEEP WHILE SITTING AND TALKING TO SOMEONE: WOULD NEVER DOZE
SITING INACTIVE IN A PUBLIC PLACE LIKE A CLASS ROOM OR A MOVIE THEATER: HIGH CHANCE OF DOZING
HOW LIKELY ARE YOU TO NOD OFF OR FALL ASLEEP IN A CAR, WHILE STOPPED FOR A FEW MINUTES IN TRAFFIC: SLIGHT CHANCE OF DOZING
ESS-CHAD TOTAL SCORE: 19
HOW LIKELY ARE YOU TO NOD OFF OR FALL ASLEEP WHEN YOU ARE A PASSENGER IN A CAR FOR AN HOUR WITHOUT A BREAK: HIGH CHANCE OF DOZING
HOW LIKELY ARE YOU TO NOD OFF OR FALL ASLEEP WHILE SITTING QUIETLY AFTER LUNCH WITHOUT ALCOHOL: HIGH CHANCE OF DOZING

## 2025-09-04 ASSESSMENT — ENCOUNTER SYMPTOMS: DEPRESSION: 0

## 2025-09-05 RX ORDER — SACUBITRIL AND VALSARTAN 24; 26 MG/1; MG/1
1 TABLET ORAL 2 TIMES DAILY
Qty: 180 TABLET | Refills: 3 | Status: SHIPPED | OUTPATIENT
Start: 2025-09-05 | End: 2026-09-05

## 2026-06-16 ENCOUNTER — APPOINTMENT (OUTPATIENT)
Dept: PRIMARY CARE | Facility: CLINIC | Age: 46
End: 2026-06-16
Payer: MEDICARE